# Patient Record
Sex: FEMALE | Race: WHITE | NOT HISPANIC OR LATINO | Employment: UNEMPLOYED | URBAN - NONMETROPOLITAN AREA
[De-identification: names, ages, dates, MRNs, and addresses within clinical notes are randomized per-mention and may not be internally consistent; named-entity substitution may affect disease eponyms.]

---

## 2019-05-14 LAB
EXTERNAL ABO GROUPING: NORMAL
EXTERNAL ANTIBODY SCREEN: NEGATIVE
EXTERNAL CHLAMYDIA SCREEN: NEGATIVE
EXTERNAL GONORRHEA SCREEN: NEGATIVE
EXTERNAL HEPATITIS B SURFACE ANTIGEN: NEGATIVE
EXTERNAL RH FACTOR: POSITIVE
EXTERNAL RUBELLA QUALITATIVE: NORMAL
EXTERNAL SYPHILIS RPR SCREEN: NORMAL
HIV1 P24 AG SERPL QL IA: NEGATIVE

## 2019-11-13 RX ORDER — FERROUS SULFATE 325(65) MG
325 TABLET ORAL 2 TIMES DAILY
COMMUNITY
End: 2020-09-25

## 2019-11-13 RX ORDER — FOLIC ACID 1 MG/1
1 TABLET ORAL DAILY
COMMUNITY
End: 2020-09-25

## 2019-11-13 RX ORDER — PNV,CALCIUM 72/IRON/FOLIC ACID 27 MG-1 MG
1 TABLET ORAL DAILY
COMMUNITY
End: 2020-09-25

## 2019-11-13 RX ORDER — LEVOTHYROXINE SODIUM 0.03 MG/1
25 TABLET ORAL DAILY
Status: ON HOLD | COMMUNITY
End: 2019-12-17 | Stop reason: SDUPTHER

## 2019-11-20 ENCOUNTER — HOSPITAL ENCOUNTER (OUTPATIENT)
Facility: HOSPITAL | Age: 29
Discharge: HOME | End: 2019-11-20
Attending: OBSTETRICS & GYNECOLOGY | Admitting: OBSTETRICS & GYNECOLOGY

## 2019-11-20 VITALS
HEIGHT: 64 IN | TEMPERATURE: 97.7 F | WEIGHT: 206.38 LBS | SYSTOLIC BLOOD PRESSURE: 134 MMHG | HEART RATE: 86 BPM | DIASTOLIC BLOOD PRESSURE: 66 MMHG | BODY MASS INDEX: 35.23 KG/M2 | RESPIRATION RATE: 18 BRPM | OXYGEN SATURATION: 97 %

## 2019-11-20 PROBLEM — Z34.90 PREGNANCY: Status: ACTIVE | Noted: 2019-11-20

## 2019-11-20 LAB — GLUCOSE BLDC GLUCOMTR-MCNC: 108 MG/DL (ref 70–130)

## 2019-11-20 PROCEDURE — G0463 HOSPITAL OUTPT CLINIC VISIT: HCPCS

## 2019-11-20 PROCEDURE — 82962 GLUCOSE BLOOD TEST: CPT

## 2019-11-20 PROCEDURE — 59025 FETAL NON-STRESS TEST: CPT

## 2019-11-20 PROCEDURE — G0378 HOSPITAL OBSERVATION PER HR: HCPCS

## 2019-11-25 ENCOUNTER — OFFICE VISIT (OUTPATIENT)
Dept: FAMILY MEDICINE CLINIC | Facility: CLINIC | Age: 29
End: 2019-11-25

## 2019-11-25 VITALS
OXYGEN SATURATION: 98 % | SYSTOLIC BLOOD PRESSURE: 124 MMHG | BODY MASS INDEX: 35.2 KG/M2 | DIASTOLIC BLOOD PRESSURE: 68 MMHG | WEIGHT: 206.2 LBS | HEIGHT: 64 IN | HEART RATE: 73 BPM

## 2019-11-25 DIAGNOSIS — Z34.90 PREGNANCY, UNSPECIFIED GESTATIONAL AGE: ICD-10-CM

## 2019-11-25 DIAGNOSIS — E03.9 ACQUIRED HYPOTHYROIDISM: ICD-10-CM

## 2019-11-25 DIAGNOSIS — O24.410 DIET CONTROLLED GESTATIONAL DIABETES MELLITUS (GDM), ANTEPARTUM: ICD-10-CM

## 2019-11-25 DIAGNOSIS — J01.00 ACUTE NON-RECURRENT MAXILLARY SINUSITIS: Primary | ICD-10-CM

## 2019-11-25 PROCEDURE — 99203 OFFICE O/P NEW LOW 30 MIN: CPT | Performed by: FAMILY MEDICINE

## 2019-11-25 RX ORDER — AMOXICILLIN 500 MG/1
1000 CAPSULE ORAL 3 TIMES DAILY
Qty: 21 CAPSULE | Refills: 0 | Status: SHIPPED | OUTPATIENT
Start: 2019-11-25 | End: 2019-12-02

## 2019-11-25 RX ORDER — LANCETS 30 GAUGE
EACH MISCELLANEOUS
COMMUNITY
Start: 2019-11-11 | End: 2020-09-25

## 2019-11-25 RX ORDER — ALBUTEROL SULFATE 90 UG/1
AEROSOL, METERED RESPIRATORY (INHALATION)
Refills: 0 | COMMUNITY
Start: 2019-08-22 | End: 2020-10-16

## 2019-12-03 LAB — EXTERNAL GROUP B STREP ANTIGEN: NEGATIVE

## 2019-12-10 ENCOUNTER — HOSPITAL ENCOUNTER (OUTPATIENT)
Facility: HOSPITAL | Age: 29
Discharge: HOME | End: 2019-12-10
Attending: OBSTETRICS & GYNECOLOGY | Admitting: OBSTETRICS & GYNECOLOGY

## 2019-12-10 PROCEDURE — 59025 FETAL NON-STRESS TEST: CPT

## 2019-12-10 PROCEDURE — G0378 HOSPITAL OBSERVATION PER HR: HCPCS

## 2019-12-10 PROCEDURE — G0463 HOSPITAL OUTPT CLINIC VISIT: HCPCS

## 2019-12-10 NOTE — NON STRESS TEST
Daisy Herrera, a  at 36w6d with an GRUPO of 2020, by Patient Reported, was seen at [facility] LABOR DELIVERY for a nonstress test.    Chief Complaint   Patient presents with   • Non-stress Test       Patient Active Problem List   Diagnosis   • Pregnancy       Start Time: 1128  Stop Time: 1158    Interpretation A  Nonstress Test Interpretation A: Reactive (12/10/19 1218 : Elle Caputo, RN)  Comments A: Verified by Celina Howard MD (12/10/19 1218 : Elle Caputo, RN)  Interpretation B  Nonstress Test Interpretation B: Reactive (12/10/19 1218 : Elle Caputo, RN)  Comments B: Verified by Celina Howard MD (12/10/19 1218 : Elle Caputo, RN)

## 2019-12-17 ENCOUNTER — ANESTHESIA (OUTPATIENT)
Dept: LABOR AND DELIVERY | Facility: HOSPITAL | Age: 29
End: 2019-12-17

## 2019-12-17 ENCOUNTER — ANESTHESIA EVENT (OUTPATIENT)
Dept: LABOR AND DELIVERY | Facility: HOSPITAL | Age: 29
End: 2019-12-17

## 2019-12-17 ENCOUNTER — HOSPITAL ENCOUNTER (INPATIENT)
Facility: HOSPITAL | Age: 29
LOS: 3 days | Discharge: HOME | End: 2019-12-20
Attending: OBSTETRICS & GYNECOLOGY | Admitting: OBSTETRICS & GYNECOLOGY

## 2019-12-17 DIAGNOSIS — E03.9 ACQUIRED HYPOTHYROIDISM: Primary | ICD-10-CM

## 2019-12-17 DIAGNOSIS — Z3A.38 38 WEEKS GESTATION OF PREGNANCY: ICD-10-CM

## 2019-12-17 DIAGNOSIS — Z37.9 NORMAL LABOR: Primary | ICD-10-CM

## 2019-12-17 DIAGNOSIS — N97.9 FEMALE STERILITY: ICD-10-CM

## 2019-12-17 LAB
ABO GROUP BLD: NORMAL
BASOPHILS # BLD AUTO: 0.04 10*3/MM3 (ref 0–0.2)
BASOPHILS NFR BLD AUTO: 0.5 % (ref 0–1.5)
BLD GP AB SCN SERPL QL: NEGATIVE
DEPRECATED RDW RBC AUTO: 47.8 FL (ref 37–54)
EOSINOPHIL # BLD AUTO: 0.14 10*3/MM3 (ref 0–0.4)
EOSINOPHIL NFR BLD AUTO: 1.7 % (ref 0.3–6.2)
ERYTHROCYTE [DISTWIDTH] IN BLOOD BY AUTOMATED COUNT: 14.4 % (ref 12.3–15.4)
HCT VFR BLD AUTO: 37.1 % (ref 34–46.6)
HGB BLD-MCNC: 13.1 G/DL (ref 12–15.9)
IMM GRANULOCYTES # BLD AUTO: 0.04 10*3/MM3 (ref 0–0.05)
IMM GRANULOCYTES NFR BLD AUTO: 0.5 % (ref 0–0.5)
LYMPHOCYTES # BLD AUTO: 1.25 10*3/MM3 (ref 0.7–3.1)
LYMPHOCYTES NFR BLD AUTO: 15.4 % (ref 19.6–45.3)
MCH RBC QN AUTO: 32.2 PG (ref 26.6–33)
MCHC RBC AUTO-ENTMCNC: 35.3 G/DL (ref 31.5–35.7)
MCV RBC AUTO: 91.2 FL (ref 79–97)
MONOCYTES # BLD AUTO: 0.44 10*3/MM3 (ref 0.1–0.9)
MONOCYTES NFR BLD AUTO: 5.4 % (ref 5–12)
NEUTROPHILS # BLD AUTO: 6.21 10*3/MM3 (ref 1.7–7)
NEUTROPHILS NFR BLD AUTO: 76.5 % (ref 42.7–76)
NRBC BLD AUTO-RTO: 0 /100 WBC (ref 0–0.2)
PLATELET # BLD AUTO: 174 10*3/MM3 (ref 140–450)
PMV BLD AUTO: 11.5 FL (ref 6–12)
RBC # BLD AUTO: 4.07 10*6/MM3 (ref 3.77–5.28)
RH BLD: POSITIVE
T&S EXPIRATION DATE: NORMAL
WBC NRBC COR # BLD: 8.12 10*3/MM3 (ref 3.4–10.8)

## 2019-12-17 PROCEDURE — C1755 CATHETER, INTRASPINAL: HCPCS | Performed by: NURSE ANESTHETIST, CERTIFIED REGISTERED

## 2019-12-17 PROCEDURE — 25010000002 FENTANYL CITRATE (PF) 250 MCG/5ML SOLUTION: Performed by: NURSE ANESTHETIST, CERTIFIED REGISTERED

## 2019-12-17 PROCEDURE — 86850 RBC ANTIBODY SCREEN: CPT | Performed by: OBSTETRICS & GYNECOLOGY

## 2019-12-17 PROCEDURE — 86900 BLOOD TYPING SEROLOGIC ABO: CPT | Performed by: OBSTETRICS & GYNECOLOGY

## 2019-12-17 PROCEDURE — 86901 BLOOD TYPING SEROLOGIC RH(D): CPT | Performed by: OBSTETRICS & GYNECOLOGY

## 2019-12-17 PROCEDURE — 85027 COMPLETE CBC AUTOMATED: CPT | Performed by: OBSTETRICS & GYNECOLOGY

## 2019-12-17 PROCEDURE — 25010000002 ROPIVACAINE PER 1 MG: Performed by: NURSE ANESTHETIST, CERTIFIED REGISTERED

## 2019-12-17 RX ORDER — SODIUM CHLORIDE 0.9 % (FLUSH) 0.9 %
3 SYRINGE (ML) INJECTION EVERY 12 HOURS SCHEDULED
Status: DISCONTINUED | OUTPATIENT
Start: 2019-12-17 | End: 2019-12-18 | Stop reason: HOSPADM

## 2019-12-17 RX ORDER — HETASTARCH 6 G/100ML
500 INJECTION, SOLUTION INTRAVENOUS CONTINUOUS
Status: DISCONTINUED | OUTPATIENT
Start: 2019-12-17 | End: 2019-12-18

## 2019-12-17 RX ORDER — OXYTOCIN/0.9 % SODIUM CHLORIDE 30/500 ML
2-30 PLASTIC BAG, INJECTION (ML) INTRAVENOUS
Status: DISCONTINUED | OUTPATIENT
Start: 2019-12-18 | End: 2019-12-18 | Stop reason: HOSPADM

## 2019-12-17 RX ORDER — EPHEDRINE SULFATE 50 MG/ML
10 INJECTION, SOLUTION INTRAVENOUS
Status: DISCONTINUED | OUTPATIENT
Start: 2019-12-17 | End: 2019-12-18 | Stop reason: HOSPADM

## 2019-12-17 RX ORDER — ONDANSETRON 2 MG/ML
4 INJECTION INTRAMUSCULAR; INTRAVENOUS EVERY 6 HOURS PRN
Status: DISCONTINUED | OUTPATIENT
Start: 2019-12-17 | End: 2019-12-18 | Stop reason: HOSPADM

## 2019-12-17 RX ORDER — BUTORPHANOL TARTRATE 1 MG/ML
1 INJECTION, SOLUTION INTRAMUSCULAR; INTRAVENOUS
Status: DISCONTINUED | OUTPATIENT
Start: 2019-12-17 | End: 2019-12-18 | Stop reason: HOSPADM

## 2019-12-17 RX ORDER — LEVOTHYROXINE SODIUM 0.03 MG/1
25 TABLET ORAL DAILY
Qty: 30 TABLET | Refills: 2 | Status: SHIPPED | OUTPATIENT
Start: 2019-12-17 | End: 2020-04-13

## 2019-12-17 RX ORDER — ACETAMINOPHEN 500 MG
1000 TABLET ORAL ONCE
Status: COMPLETED | OUTPATIENT
Start: 2019-12-18 | End: 2019-12-17

## 2019-12-17 RX ORDER — LIDOCAINE HYDROCHLORIDE AND EPINEPHRINE 15; 5 MG/ML; UG/ML
INJECTION, SOLUTION EPIDURAL AS NEEDED
Status: DISCONTINUED | OUTPATIENT
Start: 2019-12-17 | End: 2019-12-18 | Stop reason: SURG

## 2019-12-17 RX ORDER — BUTORPHANOL TARTRATE 1 MG/ML
2 INJECTION, SOLUTION INTRAMUSCULAR; INTRAVENOUS
Status: DISCONTINUED | OUTPATIENT
Start: 2019-12-17 | End: 2019-12-18 | Stop reason: HOSPADM

## 2019-12-17 RX ORDER — ROPIVACAINE HYDROCHLORIDE 5 MG/ML
INJECTION, SOLUTION EPIDURAL; INFILTRATION; PERINEURAL AS NEEDED
Status: DISCONTINUED | OUTPATIENT
Start: 2019-12-17 | End: 2019-12-18 | Stop reason: SURG

## 2019-12-17 RX ORDER — HETASTARCH 6 G/100ML
INJECTION, SOLUTION INTRAVENOUS
Status: COMPLETED
Start: 2019-12-17 | End: 2019-12-17

## 2019-12-17 RX ORDER — FENTANYL CITRATE 50 UG/ML
INJECTION, SOLUTION INTRAMUSCULAR; INTRAVENOUS AS NEEDED
Status: DISCONTINUED | OUTPATIENT
Start: 2019-12-17 | End: 2019-12-18 | Stop reason: SURG

## 2019-12-17 RX ORDER — SODIUM CHLORIDE, SODIUM LACTATE, POTASSIUM CHLORIDE, CALCIUM CHLORIDE 600; 310; 30; 20 MG/100ML; MG/100ML; MG/100ML; MG/100ML
125 INJECTION, SOLUTION INTRAVENOUS CONTINUOUS
Status: DISCONTINUED | OUTPATIENT
Start: 2019-12-17 | End: 2019-12-18

## 2019-12-17 RX ORDER — MISOPROSTOL 100 MCG
50 TABLET ORAL
Status: DISCONTINUED | OUTPATIENT
Start: 2019-12-17 | End: 2019-12-17

## 2019-12-17 RX ORDER — ONDANSETRON 4 MG/1
4 TABLET, FILM COATED ORAL EVERY 6 HOURS PRN
Status: DISCONTINUED | OUTPATIENT
Start: 2019-12-17 | End: 2019-12-18 | Stop reason: HOSPADM

## 2019-12-17 RX ORDER — LIDOCAINE HYDROCHLORIDE 10 MG/ML
5 INJECTION, SOLUTION EPIDURAL; INFILTRATION; INTRACAUDAL; PERINEURAL AS NEEDED
Status: DISCONTINUED | OUTPATIENT
Start: 2019-12-17 | End: 2019-12-18 | Stop reason: HOSPADM

## 2019-12-17 RX ORDER — ROPIVACAINE HYDROCHLORIDE 2 MG/ML
INJECTION, SOLUTION EPIDURAL; INFILTRATION; PERINEURAL AS NEEDED
Status: DISCONTINUED | OUTPATIENT
Start: 2019-12-17 | End: 2019-12-18 | Stop reason: SURG

## 2019-12-17 RX ORDER — SODIUM CHLORIDE 0.9 % (FLUSH) 0.9 %
10 SYRINGE (ML) INJECTION AS NEEDED
Status: DISCONTINUED | OUTPATIENT
Start: 2019-12-17 | End: 2019-12-18 | Stop reason: HOSPADM

## 2019-12-17 RX ORDER — MISOPROSTOL 100 MCG
25 TABLET ORAL
Status: DISCONTINUED | OUTPATIENT
Start: 2019-12-17 | End: 2019-12-17

## 2019-12-17 RX ADMIN — ROPIVACAINE HYDROCHLORIDE 5 ML: 5 INJECTION, SOLUTION EPIDURAL; INFILTRATION; PERINEURAL at 17:58

## 2019-12-17 RX ADMIN — FENTANYL CITRATE 50 MCG: 50 INJECTION, SOLUTION INTRAMUSCULAR; INTRAVENOUS at 17:58

## 2019-12-17 RX ADMIN — SODIUM CHLORIDE, POTASSIUM CHLORIDE, SODIUM LACTATE AND CALCIUM CHLORIDE 125 ML/HR: 600; 310; 30; 20 INJECTION, SOLUTION INTRAVENOUS at 10:35

## 2019-12-17 RX ADMIN — SODIUM CHLORIDE, POTASSIUM CHLORIDE, SODIUM LACTATE AND CALCIUM CHLORIDE 125 ML/HR: 600; 310; 30; 20 INJECTION, SOLUTION INTRAVENOUS at 22:18

## 2019-12-17 RX ADMIN — FENTANYL CITRATE 200 MCG: 50 INJECTION, SOLUTION INTRAMUSCULAR; INTRAVENOUS at 18:01

## 2019-12-17 RX ADMIN — ACETAMINOPHEN 1000 MG: 500 TABLET, FILM COATED ORAL at 23:20

## 2019-12-17 RX ADMIN — MISOPROSTOL 50 MCG: 100 TABLET ORAL at 15:09

## 2019-12-17 RX ADMIN — SODIUM CHLORIDE, POTASSIUM CHLORIDE, SODIUM LACTATE AND CALCIUM CHLORIDE 125 ML/HR: 600; 310; 30; 20 INJECTION, SOLUTION INTRAVENOUS at 17:50

## 2019-12-17 RX ADMIN — SODIUM CHLORIDE, POTASSIUM CHLORIDE, SODIUM LACTATE AND CALCIUM CHLORIDE 1000 ML: 600; 310; 30; 20 INJECTION, SOLUTION INTRAVENOUS at 17:08

## 2019-12-17 RX ADMIN — MISOPROSTOL 50 MCG: 100 TABLET ORAL at 20:26

## 2019-12-17 RX ADMIN — HETASTARCH 500 ML: 6 INJECTION, SOLUTION INTRAVENOUS at 21:10

## 2019-12-17 RX ADMIN — HETASTARCH IN SODIUM CHLORIDE 500 ML: 6; .9 INJECTION, SOLUTION INTRAVENOUS at 21:10

## 2019-12-17 RX ADMIN — ROPIVACAINE HYDROCHLORIDE 5 ML: 2 INJECTION, SOLUTION EPIDURAL; INFILTRATION at 17:58

## 2019-12-17 RX ADMIN — LIDOCAINE HYDROCHLORIDE AND EPINEPHRINE 3 ML: 15; 5 INJECTION, SOLUTION EPIDURAL at 17:52

## 2019-12-17 RX ADMIN — MISOPROSTOL 25 MCG: 100 TABLET ORAL at 11:07

## 2019-12-17 NOTE — ANESTHESIA PREPROCEDURE EVALUATION
Anesthesia Evaluation     Patient summary reviewed   NPO Solid Status: > 4 hours  NPO Liquid Status: < 2 hours           Airway   Mallampati: I  TM distance: >3 FB  Neck ROM: full  No difficulty expected  Dental    (+) upper dentures    Pulmonary - negative pulmonary ROS and normal exam   Cardiovascular - negative cardio ROS and normal exam  Exercise tolerance: excellent (>7 METS)        Neuro/Psych  (+) seizures well controlled,     GI/Hepatic/Renal/Endo    (+) obesity,   diabetes mellitus well controlled, thyroid problem hypothyroidism    Musculoskeletal (-) negative ROS    Abdominal  - normal exam   Substance History - negative use     OB/GYN    (+) Pregnant,         Other - negative ROS           Phys Exam Other: No results found for: WBC, HGB, HCT, MCV, PLT                Anesthesia Plan    ASA 2     epidural       Anesthetic plan, all risks, benefits, and alternatives have been provided, discussed and informed consent has been obtained with: patient.

## 2019-12-18 PROBLEM — Z37.9 NORMAL LABOR: Status: RESOLVED | Noted: 2019-12-17 | Resolved: 2019-12-18

## 2019-12-18 PROBLEM — Z34.90 PREGNANCY: Status: RESOLVED | Noted: 2019-11-20 | Resolved: 2019-12-18

## 2019-12-18 PROCEDURE — 25010000002 ONDANSETRON PER 1 MG: Performed by: NURSE ANESTHETIST, CERTIFIED REGISTERED

## 2019-12-18 PROCEDURE — 88302 TISSUE EXAM BY PATHOLOGIST: CPT | Performed by: OBSTETRICS & GYNECOLOGY

## 2019-12-18 PROCEDURE — 25010000002 ONDANSETRON PER 1 MG: Performed by: OBSTETRICS & GYNECOLOGY

## 2019-12-18 PROCEDURE — 25010000002 TERBUTALINE PER 1 MG: Performed by: OBSTETRICS & GYNECOLOGY

## 2019-12-18 PROCEDURE — 25010000002 FENTANYL CITRATE (PF) 100 MCG/2ML SOLUTION: Performed by: NURSE ANESTHETIST, CERTIFIED REGISTERED

## 2019-12-18 PROCEDURE — 88307 TISSUE EXAM BY PATHOLOGIST: CPT | Performed by: OBSTETRICS & GYNECOLOGY

## 2019-12-18 PROCEDURE — 25010000002 HYDROMORPHONE 1 MG/ML SOLUTION: Performed by: NURSE ANESTHETIST, CERTIFIED REGISTERED

## 2019-12-18 PROCEDURE — C1765 ADHESION BARRIER: HCPCS | Performed by: OBSTETRICS & GYNECOLOGY

## 2019-12-18 PROCEDURE — 25010000002 METHYLERGONOVINE MALEATE PER 0.2 MG: Performed by: NURSE ANESTHETIST, CERTIFIED REGISTERED

## 2019-12-18 PROCEDURE — 51702 INSERT TEMP BLADDER CATH: CPT

## 2019-12-18 PROCEDURE — 94799 UNLISTED PULMONARY SVC/PX: CPT

## 2019-12-18 PROCEDURE — 3E033VJ INTRODUCTION OF OTHER HORMONE INTO PERIPHERAL VEIN, PERCUTANEOUS APPROACH: ICD-10-PCS | Performed by: OBSTETRICS & GYNECOLOGY

## 2019-12-18 PROCEDURE — 25010000002 CEFEPIME PER 500 MG: Performed by: OBSTETRICS & GYNECOLOGY

## 2019-12-18 PROCEDURE — 25010000002 DEXAMETHASONE PER 1 MG: Performed by: NURSE ANESTHETIST, CERTIFIED REGISTERED

## 2019-12-18 PROCEDURE — 25010000002 ROPIVACAINE PER 1 MG: Performed by: NURSE ANESTHETIST, CERTIFIED REGISTERED

## 2019-12-18 RX ORDER — DOCUSATE SODIUM 100 MG/1
100 CAPSULE, LIQUID FILLED ORAL 2 TIMES DAILY PRN
Status: DISCONTINUED | OUTPATIENT
Start: 2019-12-18 | End: 2019-12-20 | Stop reason: HOSPADM

## 2019-12-18 RX ORDER — OXYCODONE AND ACETAMINOPHEN 7.5; 325 MG/1; MG/1
2 TABLET ORAL EVERY 4 HOURS PRN
Status: ACTIVE | OUTPATIENT
Start: 2019-12-18 | End: 2019-12-19

## 2019-12-18 RX ORDER — METHYLERGONOVINE MALEATE 0.2 MG/ML
200 INJECTION INTRAVENOUS
Status: DISPENSED | OUTPATIENT
Start: 2019-12-18 | End: 2019-12-19

## 2019-12-18 RX ORDER — DEXAMETHASONE SODIUM PHOSPHATE 4 MG/ML
INJECTION, SOLUTION INTRA-ARTICULAR; INTRALESIONAL; INTRAMUSCULAR; INTRAVENOUS; SOFT TISSUE AS NEEDED
Status: DISCONTINUED | OUTPATIENT
Start: 2019-12-18 | End: 2019-12-18 | Stop reason: SURG

## 2019-12-18 RX ORDER — PROMETHAZINE HYDROCHLORIDE 12.5 MG/1
12.5 SUPPOSITORY RECTAL EVERY 6 HOURS PRN
Status: DISCONTINUED | OUTPATIENT
Start: 2019-12-18 | End: 2019-12-20 | Stop reason: HOSPADM

## 2019-12-18 RX ORDER — OXYTOCIN/0.9 % SODIUM CHLORIDE 30/500 ML
250 PLASTIC BAG, INJECTION (ML) INTRAVENOUS CONTINUOUS
Status: ACTIVE | OUTPATIENT
Start: 2019-12-18 | End: 2019-12-18

## 2019-12-18 RX ORDER — BUPIVACAINE HCL/0.9 % NACL/PF 0.1 %
2 PLASTIC BAG, INJECTION (ML) EPIDURAL ONCE
Status: COMPLETED | OUTPATIENT
Start: 2019-12-18 | End: 2019-12-18

## 2019-12-18 RX ORDER — NALBUPHINE HCL 10 MG/ML
2.5 AMPUL (ML) INJECTION EVERY 4 HOURS PRN
Status: ACTIVE | OUTPATIENT
Start: 2019-12-18 | End: 2019-12-19

## 2019-12-18 RX ORDER — BISACODYL 5 MG/1
10 TABLET, DELAYED RELEASE ORAL DAILY PRN
Status: DISCONTINUED | OUTPATIENT
Start: 2019-12-18 | End: 2019-12-20 | Stop reason: HOSPADM

## 2019-12-18 RX ORDER — PROMETHAZINE HYDROCHLORIDE 25 MG/ML
12.5 INJECTION, SOLUTION INTRAMUSCULAR; INTRAVENOUS EVERY 6 HOURS PRN
Status: DISCONTINUED | OUTPATIENT
Start: 2019-12-18 | End: 2019-12-18 | Stop reason: HOSPADM

## 2019-12-18 RX ORDER — KETOROLAC TROMETHAMINE 30 MG/ML
30 INJECTION, SOLUTION INTRAMUSCULAR; INTRAVENOUS EVERY 6 HOURS
Status: DISCONTINUED | OUTPATIENT
Start: 2019-12-18 | End: 2019-12-19

## 2019-12-18 RX ORDER — IBUPROFEN 800 MG/1
800 TABLET ORAL EVERY 8 HOURS PRN
Status: DISCONTINUED | OUTPATIENT
Start: 2019-12-19 | End: 2019-12-19 | Stop reason: SDUPTHER

## 2019-12-18 RX ORDER — CALCIUM CARBONATE 200(500)MG
1 TABLET,CHEWABLE ORAL EVERY 6 HOURS PRN
Status: DISCONTINUED | OUTPATIENT
Start: 2019-12-18 | End: 2019-12-20 | Stop reason: HOSPADM

## 2019-12-18 RX ORDER — NALOXONE HCL 0.4 MG/ML
0.4 VIAL (ML) INJECTION
Status: ACTIVE | OUTPATIENT
Start: 2019-12-18 | End: 2019-12-19

## 2019-12-18 RX ORDER — ONDANSETRON 2 MG/ML
4 INJECTION INTRAMUSCULAR; INTRAVENOUS EVERY 6 HOURS PRN
Status: DISCONTINUED | OUTPATIENT
Start: 2019-12-18 | End: 2019-12-20 | Stop reason: HOSPADM

## 2019-12-18 RX ORDER — CARBOPROST TROMETHAMINE 250 UG/ML
250 INJECTION, SOLUTION INTRAMUSCULAR
Status: ACTIVE | OUTPATIENT
Start: 2019-12-18 | End: 2019-12-19

## 2019-12-18 RX ORDER — OXYCODONE AND ACETAMINOPHEN 10; 325 MG/1; MG/1
1 TABLET ORAL EVERY 6 HOURS PRN
Status: DISCONTINUED | OUTPATIENT
Start: 2019-12-19 | End: 2019-12-20 | Stop reason: HOSPADM

## 2019-12-18 RX ORDER — ONDANSETRON 4 MG/1
4 TABLET, FILM COATED ORAL EVERY 6 HOURS PRN
Status: DISCONTINUED | OUTPATIENT
Start: 2019-12-18 | End: 2019-12-18 | Stop reason: HOSPADM

## 2019-12-18 RX ORDER — BISACODYL 10 MG
10 SUPPOSITORY, RECTAL RECTAL DAILY PRN
Status: DISCONTINUED | OUTPATIENT
Start: 2019-12-18 | End: 2019-12-20 | Stop reason: HOSPADM

## 2019-12-18 RX ORDER — METHYLERGONOVINE MALEATE 0.2 MG/ML
200 INJECTION INTRAVENOUS ONCE AS NEEDED
Status: DISCONTINUED | OUTPATIENT
Start: 2019-12-18 | End: 2019-12-18 | Stop reason: HOSPADM

## 2019-12-18 RX ORDER — OXYCODONE HYDROCHLORIDE AND ACETAMINOPHEN 5; 325 MG/1; MG/1
1 TABLET ORAL EVERY 4 HOURS PRN
Status: DISCONTINUED | OUTPATIENT
Start: 2019-12-19 | End: 2019-12-20 | Stop reason: HOSPADM

## 2019-12-18 RX ORDER — OXYTOCIN/0.9 % SODIUM CHLORIDE 30/500 ML
999 PLASTIC BAG, INJECTION (ML) INTRAVENOUS ONCE
Status: DISCONTINUED | OUTPATIENT
Start: 2019-12-18 | End: 2019-12-20 | Stop reason: HOSPADM

## 2019-12-18 RX ORDER — LIDOCAINE HYDROCHLORIDE AND EPINEPHRINE BITARTRATE 20; .01 MG/ML; MG/ML
INJECTION, SOLUTION SUBCUTANEOUS AS NEEDED
Status: DISCONTINUED | OUTPATIENT
Start: 2019-12-18 | End: 2019-12-18 | Stop reason: SURG

## 2019-12-18 RX ORDER — SODIUM CHLORIDE, SODIUM LACTATE, POTASSIUM CHLORIDE, CALCIUM CHLORIDE 600; 310; 30; 20 MG/100ML; MG/100ML; MG/100ML; MG/100ML
125 INJECTION, SOLUTION INTRAVENOUS CONTINUOUS
Status: DISCONTINUED | OUTPATIENT
Start: 2019-12-18 | End: 2019-12-20 | Stop reason: HOSPADM

## 2019-12-18 RX ORDER — METHYLERGONOVINE MALEATE 0.2 MG/ML
INJECTION INTRAVENOUS AS NEEDED
Status: DISCONTINUED | OUTPATIENT
Start: 2019-12-18 | End: 2019-12-18 | Stop reason: SURG

## 2019-12-18 RX ORDER — PRENATAL VIT/IRON FUM/FOLIC AC 27MG-0.8MG
1 TABLET ORAL DAILY
Status: DISCONTINUED | OUTPATIENT
Start: 2019-12-18 | End: 2019-12-20 | Stop reason: HOSPADM

## 2019-12-18 RX ORDER — OXYTOCIN/0.9 % SODIUM CHLORIDE 30/500 ML
125 PLASTIC BAG, INJECTION (ML) INTRAVENOUS CONTINUOUS PRN
Status: DISCONTINUED | OUTPATIENT
Start: 2019-12-18 | End: 2019-12-18 | Stop reason: HOSPADM

## 2019-12-18 RX ORDER — SODIUM CHLORIDE 0.9 % (FLUSH) 0.9 %
3-10 SYRINGE (ML) INJECTION AS NEEDED
Status: DISCONTINUED | OUTPATIENT
Start: 2019-12-18 | End: 2019-12-20 | Stop reason: HOSPADM

## 2019-12-18 RX ORDER — AMOXICILLIN 250 MG
1 CAPSULE ORAL 2 TIMES DAILY PRN
Status: DISCONTINUED | OUTPATIENT
Start: 2019-12-18 | End: 2019-12-20 | Stop reason: HOSPADM

## 2019-12-18 RX ORDER — ONDANSETRON 4 MG/1
4 TABLET, FILM COATED ORAL EVERY 6 HOURS PRN
Status: DISCONTINUED | OUTPATIENT
Start: 2019-12-18 | End: 2019-12-20 | Stop reason: HOSPADM

## 2019-12-18 RX ORDER — MAGNESIUM HYDROXIDE 1200 MG/15ML
LIQUID ORAL AS NEEDED
Status: DISCONTINUED | OUTPATIENT
Start: 2019-12-18 | End: 2019-12-20 | Stop reason: HOSPADM

## 2019-12-18 RX ORDER — ONDANSETRON 2 MG/ML
INJECTION INTRAMUSCULAR; INTRAVENOUS AS NEEDED
Status: DISCONTINUED | OUTPATIENT
Start: 2019-12-18 | End: 2019-12-18 | Stop reason: SURG

## 2019-12-18 RX ORDER — SIMETHICONE 80 MG
80 TABLET,CHEWABLE ORAL 4 TIMES DAILY PRN
Status: DISCONTINUED | OUTPATIENT
Start: 2019-12-18 | End: 2019-12-20 | Stop reason: HOSPADM

## 2019-12-18 RX ORDER — MISOPROSTOL 200 UG/1
800 TABLET ORAL ONCE AS NEEDED
Status: ACTIVE | OUTPATIENT
Start: 2019-12-18 | End: 2019-12-19

## 2019-12-18 RX ORDER — BUTORPHANOL TARTRATE 1 MG/ML
0.5 INJECTION, SOLUTION INTRAMUSCULAR; INTRAVENOUS EVERY 6 HOURS PRN
Status: ACTIVE | OUTPATIENT
Start: 2019-12-18 | End: 2019-12-19

## 2019-12-18 RX ORDER — ONDANSETRON 2 MG/ML
4 INJECTION INTRAMUSCULAR; INTRAVENOUS EVERY 6 HOURS PRN
Status: DISCONTINUED | OUTPATIENT
Start: 2019-12-18 | End: 2019-12-18 | Stop reason: HOSPADM

## 2019-12-18 RX ORDER — OXYCODONE AND ACETAMINOPHEN 7.5; 325 MG/1; MG/1
1 TABLET ORAL EVERY 4 HOURS PRN
Status: DISPENSED | OUTPATIENT
Start: 2019-12-18 | End: 2019-12-19

## 2019-12-18 RX ORDER — TRISODIUM CITRATE DIHYDRATE AND CITRIC ACID MONOHYDRATE 500; 334 MG/5ML; MG/5ML
15 SOLUTION ORAL ONCE
Status: COMPLETED | OUTPATIENT
Start: 2019-12-18 | End: 2019-12-18

## 2019-12-18 RX ORDER — SODIUM CHLORIDE 0.9 % (FLUSH) 0.9 %
3 SYRINGE (ML) INJECTION EVERY 12 HOURS SCHEDULED
Status: DISCONTINUED | OUTPATIENT
Start: 2019-12-18 | End: 2019-12-20 | Stop reason: HOSPADM

## 2019-12-18 RX ORDER — PROMETHAZINE HYDROCHLORIDE 12.5 MG/1
12.5 SUPPOSITORY RECTAL EVERY 6 HOURS PRN
Status: DISCONTINUED | OUTPATIENT
Start: 2019-12-18 | End: 2019-12-18 | Stop reason: HOSPADM

## 2019-12-18 RX ORDER — FAMOTIDINE 10 MG/ML
20 INJECTION, SOLUTION INTRAVENOUS ONCE AS NEEDED
Status: COMPLETED | OUTPATIENT
Start: 2019-12-18 | End: 2019-12-18

## 2019-12-18 RX ORDER — MISOPROSTOL 200 UG/1
800 TABLET ORAL AS NEEDED
Status: DISCONTINUED | OUTPATIENT
Start: 2019-12-18 | End: 2019-12-18 | Stop reason: HOSPADM

## 2019-12-18 RX ORDER — CARBOPROST TROMETHAMINE 250 UG/ML
250 INJECTION, SOLUTION INTRAMUSCULAR AS NEEDED
Status: DISCONTINUED | OUTPATIENT
Start: 2019-12-18 | End: 2019-12-18 | Stop reason: HOSPADM

## 2019-12-18 RX ORDER — OXYTOCIN 10 [USP'U]/ML
INJECTION, SOLUTION INTRAMUSCULAR; INTRAVENOUS AS NEEDED
Status: DISCONTINUED | OUTPATIENT
Start: 2019-12-18 | End: 2019-12-18 | Stop reason: SURG

## 2019-12-18 RX ORDER — PROMETHAZINE HYDROCHLORIDE 25 MG/1
12.5 TABLET ORAL EVERY 6 HOURS PRN
Status: DISCONTINUED | OUTPATIENT
Start: 2019-12-18 | End: 2019-12-18 | Stop reason: HOSPADM

## 2019-12-18 RX ORDER — PROMETHAZINE HYDROCHLORIDE 25 MG/1
25 TABLET ORAL EVERY 6 HOURS PRN
Status: DISCONTINUED | OUTPATIENT
Start: 2019-12-18 | End: 2019-12-20 | Stop reason: HOSPADM

## 2019-12-18 RX ORDER — TERBUTALINE SULFATE 1 MG/ML
0.25 INJECTION, SOLUTION SUBCUTANEOUS ONCE
Status: COMPLETED | OUTPATIENT
Start: 2019-12-18 | End: 2019-12-18

## 2019-12-18 RX ORDER — METHYLERGONOVINE MALEATE 0.2 MG/ML
200 INJECTION INTRAVENOUS ONCE AS NEEDED
Status: DISCONTINUED | OUTPATIENT
Start: 2019-12-18 | End: 2019-12-18

## 2019-12-18 RX ORDER — OXYTOCIN/0.9 % SODIUM CHLORIDE 30/500 ML
125 PLASTIC BAG, INJECTION (ML) INTRAVENOUS CONTINUOUS PRN
Status: DISCONTINUED | OUTPATIENT
Start: 2019-12-18 | End: 2019-12-20 | Stop reason: HOSPADM

## 2019-12-18 RX ORDER — PROMETHAZINE HYDROCHLORIDE 25 MG/ML
12.5 INJECTION, SOLUTION INTRAMUSCULAR; INTRAVENOUS EVERY 6 HOURS PRN
Status: DISCONTINUED | OUTPATIENT
Start: 2019-12-18 | End: 2019-12-20 | Stop reason: HOSPADM

## 2019-12-18 RX ORDER — FENTANYL CITRATE 50 UG/ML
INJECTION, SOLUTION INTRAMUSCULAR; INTRAVENOUS AS NEEDED
Status: DISCONTINUED | OUTPATIENT
Start: 2019-12-18 | End: 2019-12-18 | Stop reason: SURG

## 2019-12-18 RX ORDER — OXYTOCIN/0.9 % SODIUM CHLORIDE 30/500 ML
999 PLASTIC BAG, INJECTION (ML) INTRAVENOUS ONCE
Status: DISCONTINUED | OUTPATIENT
Start: 2019-12-18 | End: 2019-12-18 | Stop reason: HOSPADM

## 2019-12-18 RX ORDER — IBUPROFEN 800 MG/1
800 TABLET ORAL EVERY 8 HOURS PRN
Status: DISCONTINUED | OUTPATIENT
Start: 2019-12-18 | End: 2019-12-18 | Stop reason: HOSPADM

## 2019-12-18 RX ADMIN — Medication 2 G: at 09:16

## 2019-12-18 RX ADMIN — CEFEPIME HYDROCHLORIDE 2 G: 2 INJECTION, POWDER, FOR SOLUTION INTRAVENOUS at 14:24

## 2019-12-18 RX ADMIN — SODIUM CHLORIDE, POTASSIUM CHLORIDE, SODIUM LACTATE AND CALCIUM CHLORIDE: 600; 310; 30; 20 INJECTION, SOLUTION INTRAVENOUS at 09:44

## 2019-12-18 RX ADMIN — DEXAMETHASONE SODIUM PHOSPHATE 4 MG: 4 INJECTION, SOLUTION INTRAMUSCULAR; INTRAVENOUS at 09:30

## 2019-12-18 RX ADMIN — LIDOCAINE HYDROCHLORIDE,EPINEPHRINE BITARTRATE 5 ML: 20; .01 INJECTION, SOLUTION INFILTRATION; PERINEURAL at 09:18

## 2019-12-18 RX ADMIN — ONDANSETRON HYDROCHLORIDE 4 MG: 2 SOLUTION INTRAMUSCULAR; INTRAVENOUS at 09:40

## 2019-12-18 RX ADMIN — METRONIDAZOLE 500 MG: 500 SOLUTION INTRAVENOUS at 10:56

## 2019-12-18 RX ADMIN — FENTANYL CITRATE 100 MCG: 50 INJECTION, SOLUTION INTRAMUSCULAR; INTRAVENOUS at 09:36

## 2019-12-18 RX ADMIN — OXYTOCIN 20 UNITS: 10 INJECTION, SOLUTION INTRAMUSCULAR; INTRAVENOUS at 09:44

## 2019-12-18 RX ADMIN — HYDROMORPHONE HYDROCHLORIDE 1000 MCG: 1 INJECTION, SOLUTION INTRAMUSCULAR; INTRAVENOUS; SUBCUTANEOUS at 10:23

## 2019-12-18 RX ADMIN — METRONIDAZOLE 500 MG: 500 SOLUTION INTRAVENOUS at 18:14

## 2019-12-18 RX ADMIN — METHYLERGONOVINE MALEATE 200 MCG: 0.2 INJECTION, SOLUTION INTRAMUSCULAR; INTRAVENOUS at 09:42

## 2019-12-18 RX ADMIN — ONDANSETRON HYDROCHLORIDE 4 MG: 2 SOLUTION INTRAMUSCULAR; INTRAVENOUS at 09:30

## 2019-12-18 RX ADMIN — ONDANSETRON HYDROCHLORIDE 4 MG: 2 SOLUTION INTRAMUSCULAR; INTRAVENOUS at 19:08

## 2019-12-18 RX ADMIN — TERBUTALINE SULFATE 0.25 MG: 1 INJECTION SUBCUTANEOUS at 07:58

## 2019-12-18 RX ADMIN — SODIUM CHLORIDE, POTASSIUM CHLORIDE, SODIUM LACTATE AND CALCIUM CHLORIDE 125 ML/HR: 600; 310; 30; 20 INJECTION, SOLUTION INTRAVENOUS at 14:21

## 2019-12-18 RX ADMIN — FAMOTIDINE 20 MG: 10 INJECTION, SOLUTION INTRAVENOUS at 09:00

## 2019-12-18 RX ADMIN — METRONIDAZOLE 500 MG: 500 SOLUTION INTRAVENOUS at 23:13

## 2019-12-18 RX ADMIN — SODIUM CHLORIDE, POTASSIUM CHLORIDE, SODIUM LACTATE AND CALCIUM CHLORIDE 999 ML/HR: 600; 310; 30; 20 INJECTION, SOLUTION INTRAVENOUS at 09:00

## 2019-12-18 RX ADMIN — OXYCODONE HYDROCHLORIDE AND ACETAMINOPHEN 1 TABLET: 7.5; 325 TABLET ORAL at 13:26

## 2019-12-18 RX ADMIN — SODIUM CHLORIDE, POTASSIUM CHLORIDE, SODIUM LACTATE AND CALCIUM CHLORIDE: 600; 310; 30; 20 INJECTION, SOLUTION INTRAVENOUS at 10:26

## 2019-12-18 RX ADMIN — OXYTOCIN-SODIUM CHLORIDE 0.9% IV SOLN 30 UNIT/500ML 2 MILLI-UNITS/MIN: 30-0.9/5 SOLUTION at 00:08

## 2019-12-18 RX ADMIN — OXYTOCIN 20 UNITS: 10 INJECTION, SOLUTION INTRAMUSCULAR; INTRAVENOUS at 09:32

## 2019-12-18 RX ADMIN — SODIUM CITRATE AND CITRIC ACID MONOHYDRATE 15 ML: 500; 334 SOLUTION ORAL at 09:00

## 2019-12-18 RX ADMIN — SODIUM CHLORIDE, POTASSIUM CHLORIDE, SODIUM LACTATE AND CALCIUM CHLORIDE 125 ML/HR: 600; 310; 30; 20 INJECTION, SOLUTION INTRAVENOUS at 06:12

## 2019-12-18 RX ADMIN — OXYTOCIN 10 UNITS: 10 INJECTION, SOLUTION INTRAMUSCULAR; INTRAVENOUS at 09:35

## 2019-12-18 RX ADMIN — IBUPROFEN 800 MG: 800 TABLET, FILM COATED ORAL at 13:26

## 2019-12-18 RX ADMIN — PRENATAL VIT W/ FE FUMARATE-FA TAB 27-0.8 MG 1 TABLET: 27-0.8 TAB at 16:45

## 2019-12-18 RX ADMIN — ROPIVACAINE HYDROCHLORIDE 10 ML: 5 INJECTION, SOLUTION EPIDURAL; INFILTRATION; PERINEURAL at 09:14

## 2019-12-18 RX ADMIN — OXYTOCIN 10 UNITS: 10 INJECTION, SOLUTION INTRAMUSCULAR; INTRAVENOUS at 09:40

## 2019-12-18 NOTE — LACTATION NOTE
"This note was copied from a baby's chart.  Name: Andria  Day:0  Dx: Low birth weigh in full term infant 1750-1999g, meconium aspiration with respiratory symptoms,  depression  Birth Gestation:38w0d  Adjusted Gestation:  Birth weight: 4-3.4 (1910g)  Last weight:              % of weight loss:    Feeding Orders:NPO  Maternal Hx:, twin pregnancy, anemia, Epilepsy, DM, Thyroid disease,  second child \"for a couple of months\"  Prenatal Medications: Iron, Folic Acid, Synthroid, PNV  Pump available:YES.  Pumping history in the last 24 hours: Initiated hand expression at 1220, initiated pumping at 1610, mother direct breastfeeding infant A      Infant B Andria, Admitted to nicu immediately after delivery due to suppressed respiratory effort with meconium aspiration. Infant also with IUGR. Feedings to be withheld at this time. With permission from mother, assisted to hand expression colostrum to provide for Andria later. Hand expressed 10 ml which was transported to NICU promptly, appropriately labeled and placed in NICU freezer per Bolivar Chopra RN request.     1610  After assisting mother to breastfeed infant A. I assisted her to initiate pumping, to stimulate milk production to provide to infant B. . NICU folder given and reviewed. Discussed importance of pumping after every feeding of infant A. Any EBM collected should be transported to NICU for infant B. Discussed use of pump and care of pump parts, sterilizing of pump parts once per 24 hours with medela steam bags. Discussed when to begin using sterile bottles with each pumping session. Mother denies any questions or concerns regarding lactation at this time. Encouragement and support provided. Lactation belt phone number placed to marker board in mother's room, encouraged her to call for assistance whenever needed.         "

## 2019-12-18 NOTE — NURSING NOTE
Dr. Howard updated on Baby B recurrent decels after nursing interventions. Orders received    Suni Hidalgo RN

## 2019-12-18 NOTE — PLAN OF CARE
Patient stable during recovery peroid. Pain medications administered prior to leaving recovery area. Jennifer (Baby A) taken to NICU for observation due to grunting that had not resolved since delivery. Sanaz (Baby B) admitted to NICU. Patient anxious about status of both babies.

## 2019-12-18 NOTE — ANESTHESIA PROCEDURE NOTES
Labor Epidural      Patient location during procedure: OB  Performed By  CRNA: James Brady CRNA  Preanesthetic Checklist  Completed: patient identified, site marked, surgical consent, pre-op evaluation, timeout performed, IV checked, risks and benefits discussed and monitors and equipment checked  Prep:  Pt Position:sitting  Sterile Tech:cap, gloves and sterile barrier  Prep:chlorhexidine gluconate and isopropyl alcohol  Monitoring:blood pressure monitoring and continuous pulse oximetry  Epidural Block Procedure:  Approach:midline  Guidance:palpation technique  Needle Type:Tuohy  Needle Gauge:18 G  Aspiration:negative  Test Dose:negative  Number of Attempts: 2  Post Assessment:  Dressing:occlusive dressing applied and secured with tape  Pt Tolerance:patient tolerated the procedure well with no apparent complications  Complications:no

## 2019-12-18 NOTE — PLAN OF CARE
Patient on cytotec induction until 2300, then switched to pitocin induction. Patient has an epidural and is reporting no pain. Patient currently has pitocin turned off due to fetal strip. Baby A is vertex, baby B is breech. Plan is to deliver both vaginally. Last SVE at 0319 was 5cm, 90%, -2, mid position, with bloody show. Two baby girls, breastfeeding, ped-EMTALA.

## 2019-12-18 NOTE — LACTATION NOTE
"Mother's Name: Daisy  Phone #:  Infant Name: Jennifer (twin A) :19  Gestation:38w0d  Day of life:0  Birth weight:  7-7.9 (3400g) Discharge weight:  Weight Loss:   24 hour Summary of Feeds:  Voids:  Stools:  Assistive devices (shields, shells, etc):NA  Significant Maternal history:, Twin pregnancy, Anemia, Epilepsy, DM, Thyroid disorder,  first child for \"a couple of months\", youngest child now 6 yrs old  Maternal Concerns:  Denies concerns at this time  Maternal Goal: Exclusive breastfeeding through 1st year  Mother's Medications: Iron, Folic Acid, Synthroid, PNV  Breastpump for home: YES. Mottif  Ped follow up appt:    Called to LDR at 1115 to assist with feeding infant A. Lactation assisting another patient at this time so unable to be present. Requested ADT nurse Meredith Abreu RN to place infant skin to skin with mother, if infant showing hunger cues, Meredith should assist mother to latch. If no hunger cues present, infant may rest skin to skin until lactation available.    Instructed mom our lactation team is here for continued support throughout their breastfeeding journey. Our team has encouraged mom to call with any questions or concerns that may arise after discharge.    1220  To patients bedside, report from Dena Prajapati RN, she was able to assist with latching infant to left breast for 15 min of feeding, when attempting to latch to right breast, infant developed increased respiratory effort with grunting respirations, infant was taken to NICU for transitioning. Infant B admitted to NICU due immediately after delivery. With permission from mother, I assisted her to hand express, collecting 10 ml of colostrum from bilateral breast, left breast expresses more easily at this time. Colostrum immediately transported to NICU, labeled appropriately and placed in NICU freezer per Bolivar Chopra RN request. Infant A has now transitioned and will be rooming in with mother once mother " transfers to 2A. Instructed Jenn Kaur RN to inform mother to breastfeed infant A upon arrival to room.     1437  Requested per Maria Luisa Davis, RN to assist mother with breast feeding infant. She states mother has been independently attempting but unsuccessful, she had instructed mother to keep infant skin to skin while waiting for lactation. Lactation unavailable at this time, I instructed Maria Luisa to assist mother with latching infant and I will follow up once available.    1505  To room to follow up with mother, mother states no one has returned to room to assist with feeding. I assisted mother to change infant's wet diaper, stool smear noted on infant's bottom as well. Assisted mother to position infant to right breast in cross cradle hold. Infant becomes very sleepy at the breast prior to latching. Requiring frequent stimulation. With stimulation, infant began rooting, once gaping wide, assisted to sandwich breast tissue and rolling into infant's mouth, latch achieved and infant began sucking with deep jaw drops, audible swallows noted. Infant again became sleepy very quickly, requiring frequent stimulation. Achieved 15 mins of active feeding to right breast. Demonstrated estefani stimulus to wake infant between breast. Moved infant to left breast in football hold, few attempts required for infant to successfully latch and begin nursing with deep jaw drops, audible swallows again noted. Assisted and recommended compression of breast during feeding. Infant again becomes sleepy at breast very quickly, latch released x1 but easily latched back to breast, infant nursed total of 20 mins. Breast pump set up to beside in room, education provided on pump use and care of pump parts. Initial breastfeeding packet given but only briefly reviewed. Discussion consisting mainly of feeding frequency, adequate voids and stools, signs of adequate feeding and keeping infant active at the breast for adequate feeding. Encouraged  mother to call for assistance whenever needed. Mother verbalizes understanding. Denies further questions or concerns. Advised mother to pump at this time. Mother requested infant to be swaddled and placed in crib so she may pump. Set up to side of bed and began pumping. Encouragement and support provided.

## 2019-12-18 NOTE — OP NOTE
Section Procedure Note    Indications: non-reassuring fetal status, di-di twins, twin b IUGR and abnormal dopplers    Pre-operative Diagnosis: non-reassuring fetal status, di-di twins, twin b IUGR and abnormal dopplers    Post-operative Diagnosis: non-reassuring fetal status, di-di twins, twin b IUGR and abnormal dopplers    Surgeon: Celina Howard MD     Assistants: None    Anesthesia: Epidural anesthesia    ASA Class: 2      Details of Procedure:   The risks, benefits, indications and alternatives of the procedure were reviewed with the patient and informed consent was obtained. The patient was taken to the Operating Room with an IV running. She was placed in the dorsal supine position, with a leftward tilt, then prepped and draped in the usual sterile fashion. Her spinal was noted to not be providing adequate anesthesia after an Allis test was performed,therefore she was put under general. A Pfannenstiel skin incision was made approximately 2cm above the symphysis pubis and extended down sharply to the rectus fascia. The fascia was then nicked in the midline and extended laterally. The muscles of the anterior abdominal wall were . The fascia was then grasped and the fascial incision extended laterally via superior and inferior traction. The peritoneum was next identified, and entered bluntly with the digits. The peritoneal incision was also extended via traction, taking care to note the position of the bladder. Next a bladder blade was inserted. A vesicouterine incision was made with the metzembaum scissors, and a bladder flap gently created. The bladder blade was then reinserted. Next, the uterine incision was made with the scalpel, in transverse, elliptical fashion. The uterine incision was extended laterally via traction. The bladder blade was then removed, and the fetal vertex delivered through the uterine incision without difficulty. Nares and mouth were suctioned on the sterile  field. The remainder of the infant was then delivered, and infant passed to the awaiting NICU team. Next, the placenta was delivered manually, taking care to remove all membranes. The uterus was then exteriorized, and cleared of all clot and debris. The uterine incision was closed with 0-Vicryl in running, locking fashion. Excellent hemostasis was noted. Next, the tubes on each side where grasped with emelyn clamps. A 0-Plain gut suture ligature was transfixed in the mesosalpinx and a suture ligature placed across a knuckle of each tube. A second free tie of plain gut was used and a second suture ligature placed around each tube. The metzembaum scissors were then used to create a window through the mesosalpinx, and the knuckle of tube excised and passed off, after verifying tubal remnants were present in each specimen. The ends of the remaining tubal remnants were then cauterized with excellent hemostasis noted. Copious irrigation of the posterior cul-de-sac was performed with warm, sterile saline. The uterus was returned to the abdominal cavity, and the gutters cleared of all clot and debris. Next interceed was placed over the uterine incision and over the body of the uterus to prevent future adhesions. The fascia was then closed with 0-Vicryl in running fashion. Interceed was also placed over the fascia to prevent adhesions. The skin was subsequently closed with Stratafix suture in 2 layers. The skin was then dressed with Mepilex guaze..    The patient tolerated the procedure well, and sponge, lap and needle counts were correct x 2.    DISPOSITION: stable to the labor & delivery recovery room.       Findings:  Twin A  VFI  In cephalic, OA position, clear,no nuchal cord. Manual extraction of  Normal placenta and cord. Apgars      Khai Herrera [[id number]]           APGARS  One minute Five minutes Ten minutes Fifteen minutes Twenty minutes   Skin color:   1   1           Heart rate:   2   2            Grimace:   2   2            Muscle tone:   2   2            Breathin   2            Totals:   9   9              Twin B  VFI  In cephalic, OA position, meconium stained AF,no nuchal cord. Manual extraction of  Small placenta and cord. Apgars          Khai Herrera [[id number]]           APGARS  One minute Five minutes Ten minutes Fifteen minutes Twenty minutes   Skin color: 0   0   1          Heart rate: 1   1   2          Grimace: 1   1   2           Muscle tone: 0   0   2           Breathin   1   2           Totals: 2   3   9           , Weight:      Khai Herrera [[id number]]   3400 g (7 lb 7.9 oz)     Khai Herrera [[id number]]   1910 g (4 lb 3.4 oz)   Length:      Khai Herrera [[id number]]   20     Khai Herrera [[id number]]   18.75   in. NICU/Nursery Present.    Estimated Blood Loss:  900ml           Drains: 300ml           Total IV Fluids: 2800ml           Specimens: Placenta x 2, portions r/l tubes, gases on twin B, cord blood x 2           Implants: Interceed x 2           Complications:  None; patient tolerated the procedure well.           Disposition: PACU - hemodynamically stable.           Condition: stable    Attending Attestation: I performed the procedure.    Celina Howard MD  2019  1:31 AM

## 2019-12-18 NOTE — NURSING NOTE
Dr. Howard updated on Baby B's lates and reason why cytotec was held due to babies being off the monitor and Baby B's strip. Dr. Howard said it was okay to give next dose of cytotec    Suni Hidalgo RN

## 2019-12-19 LAB
BASOPHILS # BLD AUTO: 0.02 10*3/MM3 (ref 0–0.2)
BASOPHILS NFR BLD AUTO: 0.2 % (ref 0–1.5)
DEPRECATED RDW RBC AUTO: 50.6 FL (ref 37–54)
EOSINOPHIL # BLD AUTO: 0.03 10*3/MM3 (ref 0–0.4)
EOSINOPHIL NFR BLD AUTO: 0.3 % (ref 0.3–6.2)
ERYTHROCYTE [DISTWIDTH] IN BLOOD BY AUTOMATED COUNT: 14.6 % (ref 12.3–15.4)
HCT VFR BLD AUTO: 30.5 % (ref 34–46.6)
HGB BLD-MCNC: 10.5 G/DL (ref 12–15.9)
IMM GRANULOCYTES # BLD AUTO: 0.05 10*3/MM3 (ref 0–0.05)
IMM GRANULOCYTES NFR BLD AUTO: 0.5 % (ref 0–0.5)
LYMPHOCYTES # BLD AUTO: 0.7 10*3/MM3 (ref 0.7–3.1)
LYMPHOCYTES NFR BLD AUTO: 6.8 % (ref 19.6–45.3)
MCH RBC QN AUTO: 32.4 PG (ref 26.6–33)
MCHC RBC AUTO-ENTMCNC: 34.4 G/DL (ref 31.5–35.7)
MCV RBC AUTO: 94.1 FL (ref 79–97)
MONOCYTES # BLD AUTO: 0.6 10*3/MM3 (ref 0.1–0.9)
MONOCYTES NFR BLD AUTO: 5.8 % (ref 5–12)
NEUTROPHILS # BLD AUTO: 8.96 10*3/MM3 (ref 1.7–7)
NEUTROPHILS NFR BLD AUTO: 86.4 % (ref 42.7–76)
NRBC BLD AUTO-RTO: 0 /100 WBC (ref 0–0.2)
PLATELET # BLD AUTO: 156 10*3/MM3 (ref 140–450)
PMV BLD AUTO: 11.3 FL (ref 6–12)
RBC # BLD AUTO: 3.24 10*6/MM3 (ref 3.77–5.28)
WBC NRBC COR # BLD: 10.36 10*3/MM3 (ref 3.4–10.8)

## 2019-12-19 PROCEDURE — 85025 COMPLETE CBC W/AUTO DIFF WBC: CPT | Performed by: OBSTETRICS & GYNECOLOGY

## 2019-12-19 PROCEDURE — 25010000002 CEFEPIME PER 500 MG: Performed by: OBSTETRICS & GYNECOLOGY

## 2019-12-19 RX ORDER — IBUPROFEN 800 MG/1
800 TABLET ORAL EVERY 8 HOURS PRN
Status: DISCONTINUED | OUTPATIENT
Start: 2019-12-19 | End: 2019-12-20 | Stop reason: HOSPADM

## 2019-12-19 RX ADMIN — POLYETHYLENE GLYCOL 3350 17 G: 17 POWDER, FOR SOLUTION ORAL at 10:29

## 2019-12-19 RX ADMIN — OXYCODONE HYDROCHLORIDE AND ACETAMINOPHEN 1 TABLET: 5; 325 TABLET ORAL at 17:24

## 2019-12-19 RX ADMIN — IBUPROFEN 800 MG: 800 TABLET ORAL at 20:37

## 2019-12-19 RX ADMIN — CEFEPIME HYDROCHLORIDE 2 G: 2 INJECTION, POWDER, FOR SOLUTION INTRAVENOUS at 00:35

## 2019-12-19 RX ADMIN — METRONIDAZOLE 500 MG: 500 SOLUTION INTRAVENOUS at 05:39

## 2019-12-19 RX ADMIN — IBUPROFEN 800 MG: 800 TABLET ORAL at 10:32

## 2019-12-19 RX ADMIN — OXYCODONE HYDROCHLORIDE AND ACETAMINOPHEN 1 TABLET: 5; 325 TABLET ORAL at 22:52

## 2019-12-19 RX ADMIN — SODIUM CHLORIDE, PRESERVATIVE FREE: 5 INJECTION INTRAVENOUS at 10:30

## 2019-12-19 RX ADMIN — IBUPROFEN 800 MG: 800 TABLET ORAL at 02:57

## 2019-12-19 RX ADMIN — CEFEPIME HYDROCHLORIDE 2 G: 2 INJECTION, POWDER, FOR SOLUTION INTRAVENOUS at 12:07

## 2019-12-19 RX ADMIN — PRENATAL VIT W/ FE FUMARATE-FA TAB 27-0.8 MG 1 TABLET: 27-0.8 TAB at 10:29

## 2019-12-19 NOTE — PLAN OF CARE
Vss. Ff/ml/u1 scant  lochia. ALT c/d/I with dermabond and binder in place. Cornelia suarez'd @5658..Due to void. IV antibiotics per order. Motrin for pain prn. Breastfeeding, pumping, and supplementing with formula. Bilateral nipples tender and bruised. Lanolin given. Ambulating to NICU to visit twin B. Bonding with twin A appropriately...Significant other at bedside.

## 2019-12-19 NOTE — ANESTHESIA POSTPROCEDURE EVALUATION
Patient: Daisy Herrera    Procedure Summary     Date:  19 Room / Location:  [facility] LABOR DELIVERY 2 /  [facility] LABOR DELIVERY    Anesthesia Start:   Anesthesia Stop:  19 102    Procedure:   SECTION PRIMARY WITH TUBAL (Bilateral Abdomen) Diagnosis:      Surgeon:  Celina Howard MD Provider:  James Brady CRNA    Anesthesia Type:  epidural ASA Status:  2          Anesthesia Type: epidural    Vitals  Vitals Value Taken Time   /79 2019 12:12 PM   Temp 97.3 °F (36.3 °C) 2019 12:12 PM   Pulse 68 2019 12:12 PM   Resp 16 2019 12:12 PM   SpO2 98 % 2019 12:12 PM           Post Anesthesia Care and Evaluation    Patient location during evaluation: floor  Anesthetic complications: No anesthetic complications  Post Neuraxial Block status: Motor and sensory function returned to baseline and No signs or symptoms of PDPH

## 2019-12-19 NOTE — LACTATION NOTE
"Mother's Name: Daisy  Phone #: [phone]  Infant Name: Saul   :19  Gestation:38w0d  Day of life:1  Significant Maternal history:, Twin pregnancy, Anemia, Epilepsy, DM, Thyroid disorder,  first child for \"a couple of months\", youngest child now 6 yrs old  Maternal Concerns:  Denies concerns at this time  Maternal Goal: Exclusive breastfeeding through 1st year  Mother's Medications: Iron, Folic Acid, Synthroid, PNV  Breastpump for home: YES. Mottif  Ped follow up appt:    Patient states she is giving direct breastfeeding a break due to bruising to breast from latching. She states she is pumping every 5-6 hours and has collected 9 ml today. Recommended returning to infant to breast as soon as possible if desired to prevent infant from preferring bottle flow and ease of latch, also to help stimulate milk production in addition to pumping. Reviewed pumping plan, hand expressing, hands on pumping, power pumping, latching techniques, healing of nipples, protecting milk supply, feeding options with BF/EBM/formula. Offered support and encouragement. Recommended viewing educational videos on handout.     Instructed mom our lactation team is here for continued support throughout their breastfeeding journey. Our team has encouraged mom to call with any questions or concerns that may arise after discharge.     "

## 2019-12-19 NOTE — PLAN OF CARE
Problem: Patient Care Overview  Goal: Plan of Care Review  Outcome: Ongoing (interventions implemented as appropriate)  Flowsheets  Taken 12/19/2019 0620 by Dorinda Thompson RN  Progress: improving  Taken 12/19/2019 0855 by Lamar Campuzano RN  Plan of Care Reviewed With: patient;significant other  Taken 12/19/2019 1704 by Lamar Campuzano RN  Outcome Summary: Low transverse incision is well aproximated with clear dressing intact.  No S/S of infection, no drainage present.  Dependent edema is 2+ in ankles and feet.  IV antibiotics given as ordered, pain med give x 1 with some relief.  Mom pumping at bedside for baby A and taking some mild to NICU for Baby B.  Mom is bonding well with baby.  Goal: Individualization and Mutuality  Outcome: Ongoing (interventions implemented as appropriate)  Flowsheets (Taken 12/19/2019 0620 by Dorinda Thompson RN)  Patient Specific Goals (Include Timeframe): successful breastfeeding; adequate milk supply for twins  How to Address Anxieties/Fears: provide emotional support/encouragement; allow for expression of feelings; answer questions as they arrise  Patient Specific Interventions: breastfeeding support and encouragement; assisted pt to NICU; emotional support given  What Information Would Help Us Give You More Personalized Care?: twin A is Jennifer and twin B is Camilia  How Would You and/or Your Support Person Like to Participate in Your Care?: keep involved in all aspects of care  What Anxieties, Fears, Concerns, or Questions Do You Have About Your Care?: Anxious r/t twin B in NICU  Patient Specific Preferences: breastfeeding, pumping, and supplementing  Goal: Discharge Needs Assessment  Outcome: Ongoing (interventions implemented as appropriate)  Flowsheets  Taken 12/19/2019 0620 by Dorinda Thompson RN  Equipment Needed After Discharge: none  Equipment Currently Used at Home: none  Anticipated Changes Related to Illness: none  Transportation Anticipated:  family or friend will provide  Transportation Concerns: car, none  Concerns to be Addressed: no discharge needs identified  Patient/Family Anticipated Services at Transition: none  Taken 2019 1412 by Namrata Prajapati, RN  Concerns Comments: Sanaz (Baby B) in NICU, may be there an extended period  Readmission Within the Last 30 Days: no previous admission in last 30 days  Taken 2019 1010 by Jennifer Lees, RN  Patient/Family Anticipates Transition to: home;home with family  Goal: Interprofessional Rounds/Family Conf  Outcome: Ongoing (interventions implemented as appropriate)     Problem: Postpartum ( Delivery) (Adult,Obstetrics,Pediatric)  Goal: Signs and Symptoms of Listed Potential Problems Will be Absent, Minimized or Managed (Postpartum)  Outcome: Ongoing (interventions implemented as appropriate)  Flowsheets (Taken 2019 1828 by Maria Luisa Davis RN)  Problems Assessed (Postpartum ): all  Problems Present (Postpartum ): none  Goal: Anesthesia/Sedation Recovery  Outcome: Ongoing (interventions implemented as appropriate)     Problem: Breastfeeding (Adult,Obstetrics,Pediatric)  Goal: Signs and Symptoms of Listed Potential Problems Will be Absent, Minimized or Managed (Breastfeeding)  Outcome: Ongoing (interventions implemented as appropriate)  Flowsheets (Taken 2019 0620 by Dorinda Thompson RN)  Problems Assessed (Breastfeeding): all  Problems Present (Breastfeeding): skin breakdown;situational response

## 2019-12-19 NOTE — PROGRESS NOTES
"[facility]   Section Postpartum Delivery Progress Note    Subjective   Postpartum Day 1:  Delivery    The patient feels well.  Her pain is well controlled with prescribed pain medications.   She is ambulating well.  Patient describes her bleeding as moderate lochia.    Breastfeeding: without difficulty.    Objective     Vital Signs Range for the last 24 hours  Temperature: Temp:  [97.7 °F (36.5 °C)-98.7 °F (37.1 °C)] 97.7 °F (36.5 °C)   Temp Source: Temp src: Temporal   BP: BP: (104-143)/(58-91) 136/71   Pulse: Heart Rate:  [] 64   Respirations: Resp:  [16-19] 18   SPO2: SpO2:  [95 %-100 %] 98 %   O2 Amount(L/min):     O2 Devices Device (Oxygen Therapy): room air   Weight:       Admit Height:  Height: 165.1 cm (65\")      Physical Exam:  General:  no acute distresss.  Abdomen: Fundus: appropriate, firm, non tender  Extremities: normal, atraumatic, no cyanosis, and trace edema.   Incision: clean, dry & intact    Lab results reviewed:  Yes   Rubella:  No results found for: RUBELLAIGGIN Nurse Transcribed from prenatal record --  No components found for: EXTRUBELQUAL  Rh Status:    RH type   Date Value Ref Range Status   2019 Positive  Final     Immunizations: There is no immunization history for the selected administration types on file for this patient.    Assessment/Plan       * No active hospital problems. *      Daisy Herrera is Day 1  post-partum       Herrera, VictoriasGirl A [[id number]]   , Low Transverse     Herrera, VictoriasGirl B [[id number]]   , Low Transverse        Herrera, VictoriasGirl A [[id number]]         Herrera, VictoriasGirl B [[id number]]      .      Plan:  Continue current care.      Celina Howard MD  2019  1:34 AM  "

## 2019-12-19 NOTE — LACTATION NOTE
"Mother's Name: Daisy  Phone #: [phone]  Infant Name: Jennifer (twin A) :19  Gestation:38w0d  Day of life:1  Birth weight:  7-7.9 (3400g) Discharge weight:  Weight Loss: -5.7%  24 hour Summary of Feeds: BFx3 EBM 1.1 ml Formula x2  Voids:4 Stools:4  Assistive devices (shields, shells, etc):NA  Significant Maternal history:, Twin pregnancy, Anemia, Epilepsy, DM, Thyroid disorder,  first child for \"a couple of months\", youngest child now 6 yrs old  Maternal Concerns:  Denies concerns at this time  Maternal Goal: Exclusive breastfeeding through 1st year  Mother's Medications: Iron, Folic Acid, Synthroid, PNV  Breastpump for home: YES. Mottif  Ped follow up appt:      Attempted to follow up with mother to discuss breastfeeding progress. Mother and infant not in room at this time.       Instructed mom our lactation team is here for continued support throughout their breastfeeding journey. Our team has encouraged mom to call with any questions or concerns that may arise after discharge.     "

## 2019-12-20 VITALS
SYSTOLIC BLOOD PRESSURE: 129 MMHG | TEMPERATURE: 97.4 F | DIASTOLIC BLOOD PRESSURE: 86 MMHG | BODY MASS INDEX: 35.32 KG/M2 | HEIGHT: 65 IN | HEART RATE: 74 BPM | OXYGEN SATURATION: 99 % | WEIGHT: 212 LBS | RESPIRATION RATE: 20 BRPM

## 2019-12-20 RX ORDER — IBUPROFEN 800 MG/1
800 TABLET ORAL EVERY 8 HOURS PRN
Qty: 90 TABLET | Refills: 2 | Status: SHIPPED | OUTPATIENT
Start: 2019-12-20 | End: 2020-09-25

## 2019-12-20 RX ORDER — OXYCODONE HYDROCHLORIDE AND ACETAMINOPHEN 5; 325 MG/1; MG/1
1 TABLET ORAL EVERY 6 HOURS PRN
Qty: 42 TABLET | Refills: 0 | Status: SHIPPED | OUTPATIENT
Start: 2019-12-20 | End: 2019-12-29

## 2019-12-20 RX ADMIN — IBUPROFEN 800 MG: 800 TABLET ORAL at 14:40

## 2019-12-20 RX ADMIN — PRENATAL VIT W/ FE FUMARATE-FA TAB 27-0.8 MG 1 TABLET: 27-0.8 TAB at 12:53

## 2019-12-20 RX ADMIN — OXYCODONE HYDROCHLORIDE AND ACETAMINOPHEN 1 TABLET: 5; 325 TABLET ORAL at 12:52

## 2019-12-20 RX ADMIN — IBUPROFEN 800 MG: 800 TABLET ORAL at 06:19

## 2019-12-20 RX ADMIN — POLYETHYLENE GLYCOL 3350 17 G: 17 POWDER, FOR SOLUTION ORAL at 12:53

## 2019-12-20 NOTE — PLAN OF CARE
Problem: Patient Care Overview  Goal: Plan of Care Review  Outcome: Ongoing (interventions implemented as appropriate)  Flowsheets (Taken 2019 042)  Progress: improving  Plan of Care Reviewed With: patient; significant other  Outcome Summary: VSS, fundus and lochia wnl, incision wnl, edema 2+ present in bilateral lower extremities, PO pain meds control pain, pumping and supplementing, ambulated to NICU twice, bonding well with , SO supportive at bedside.  Goal: Individualization and Mutuality  Outcome: Ongoing (interventions implemented as appropriate)  Flowsheets  Taken 2019 0425 by Arlin Epstein, RN  Patient Specific Goals (Include Timeframe): pain control, home with healthy infant  Patient Specific Interventions: assist prn with infant care, admister prn pain meds  Taken 2019 06 by Dorinda Thompson RN  Patient Specific Preferences: breastfeeding, pumping, and supplementing  Goal: Discharge Needs Assessment  Outcome: Ongoing (interventions implemented as appropriate)  Flowsheets  Taken 2019 06 by Dorinda Thompson RN  Equipment Needed After Discharge: none  Equipment Currently Used at Home: none  Anticipated Changes Related to Illness: none  Transportation Anticipated: family or friend will provide  Transportation Concerns: car, none  Concerns to be Addressed: no discharge needs identified  Patient/Family Anticipated Services at Transition: none  Taken 2019 1412 by Namrata Prajapati, RN  Concerns Comments: Sanaz (Baby B) in NICU, may be there an extended period  Readmission Within the Last 30 Days: no previous admission in last 30 days  Taken 2019 1010 by Jennifer Lees, RN  Patient/Family Anticipates Transition to: home;home with family  Goal: Interprofessional Rounds/Family Conf  Outcome: Ongoing (interventions implemented as appropriate)     Problem: Postpartum ( Delivery) (Adult,Obstetrics,Pediatric)  Goal: Signs and Symptoms of Listed  Potential Problems Will be Absent, Minimized or Managed (Postpartum)  Outcome: Ongoing (interventions implemented as appropriate)  Flowsheets (Taken 2019 1828 by Maria Luisa Davis, RN)  Problems Assessed (Postpartum ): all  Problems Present (Postpartum ): none  Goal: Anesthesia/Sedation Recovery  Outcome: Ongoing (interventions implemented as appropriate)     Problem: Breastfeeding (Adult,Obstetrics,Pediatric)  Goal: Signs and Symptoms of Listed Potential Problems Will be Absent, Minimized or Managed (Breastfeeding)  Outcome: Ongoing (interventions implemented as appropriate)  Flowsheets (Taken 2019 0620 by Dorinda Thompson, RN)  Problems Assessed (Breastfeeding): all  Problems Present (Breastfeeding): skin breakdown;situational response

## 2019-12-20 NOTE — LACTATION NOTE
"This note was copied from a baby's chart.  Lactation consult conducted with pt.  Twin girls:  A: Jennifer, well baby  B:  Sanaz, NICU    Daisy states she is pumping approx 4-5 times per day. She obtained 40 mls at last session. She notes bf'ed  directly once, \"But she broke me of that. She tried to use me as a pacifier, for an hour and a half.\"  I educated her on keeping infant stimulated and drinking while at breast and removing and holding skin to skin once feed is finished. Pt voiced she plans to pump and give EBM. She does not wish to directly bf. Affirmed her in this. Educated on importance of removing milk to make milk; noting that pumping every 3 hours with a pump that fits properly, should allow her to attain maximum milk production. Pt acknowledged this, but did not voice that she plans to pump more often. Open RTF formula with aprox 20 mls on table in front of mother. Urged pt to call on lactation for support throughout lactation period as needed.    "

## 2019-12-20 NOTE — DISCHARGE SUMMARY
Discharge Summary    [facility]  Daisy Herrera  : 1990  MRN: [id number]  [facility]: [id number]    Date of Admission: 2019   Date of Discharge:  2019   Delivering Physician:    Khai Herrera [[id number]]   Khai Cook [[id number]]             Admission Diagnosis: 1. Normal labor [O80, Z37.9]  2.  delivery delivered [O82]   Discharge Diagnosis: 1. Pregnancy at 38w0d - delivered       Procedures:    Prasad Herreramaría MENA [[id number]]   2019     Prasad Herreramaría FLORES [[id number]]   2019   -      Javier Khai MENA [[id number]]   , Low Transverse     HerreraKhai [[id number]]   , Low Transverse        Hospital Course  Patient is a 29 y.o.  who at 38w0d had a  section.  Her postoperative course was without complications.  On POD #2 she was ready for discharge.  She had normal lochia and pain well controlled with oral medications.    Infant     Khai Herrera [[id number]]   female     Khai Herrera [[id number]]   female   fetus weighing      Khai Herrera [[id number]]   3400 g (7 lb 7.9 oz)     Prasad Herreraclarissakathie FLORES [[id number]]   1910 g (4 lb 3.4 oz)    Apgars -       Khai Herrera [[id number]]   8      Khai Herrera [[id number]]   2   @ 1 minute /       Javier DaisyJacky MENA [[id number]]   9      Khai Herrera [[id number]]   3   @ 5 minutes.    Discharge labs  Lab Results   Component Value Date    WBC 10.36 2019    HGB 10.5 (L) 2019    HCT 30.5 (L) 2019     2019       Discharge Medications     Discharge Medications      New Medications      Instructions Start Date   ibuprofen 800 MG tablet  Commonly known as:  ADVIL,MOTRIN   800 mg, Oral, Every 8 Hours PRN      oxyCODONE-acetaminophen 5-325 MG per tablet  Commonly known as:  PERCOCET   1 tablet, Oral, Every 6 Hours PRN         Continue These Medications       Instructions Start Date   albuterol sulfate  (90 Base) MCG/ACT inhaler  Commonly known as:  PROVENTIL HFA;VENTOLIN HFA;PROAIR HFA   INHALE 2 PUFFS BY MOUTH 4 TIMES DAILY AS NEEDED      ferrous sulfate 325 (65 FE) MG tablet   325 mg, Oral, 2 Times Daily      folic acid 1 MG tablet  Commonly known as:  FOLVITE   1 mg, Oral, Daily      levothyroxine 25 MCG tablet  Commonly known as:  SYNTHROID, LEVOTHROID   25 mcg, Oral, Daily      ONETOUCH DELICA PLUS [id number] misc   No dose, route, or frequency recorded.      PREPLUS 27-1 MG tablet   1 tablet, Oral, Daily             Discharge Disposition Home or Self Care   Condition on Discharge: good   Follow-up: 2 weeks with MD Celina Andrade MD  12/20/2019

## 2019-12-20 NOTE — PROGRESS NOTES
"[facility]   Section Postpartum Delivery Progress Note    Subjective   Postpartum Day 2:  Delivery    The patient feels well.  Her pain is well controlled with prescribed pain medications.   She is ambulating well.  Patient describes her bleeding as thin lochia.    Breastfeeding: without difficulty.    Objective     Vital Signs Range for the last 24 hours  Temperature: Temp:  [97.3 °F (36.3 °C)-98.5 °F (36.9 °C)] 97.4 °F (36.3 °C)   Temp Source: Temp src: Temporal   BP: BP: (124-142)/(77-86) 129/86   Pulse: Heart Rate:  [63-90] 74   Respirations: Resp:  [16-20] 20   SPO2: SpO2:  [97 %-100 %] 99 %   O2 Amount(L/min):     O2 Devices Device (Oxygen Therapy): room air   Weight:       Admit Height:  Height: 165.1 cm (65\")      Physical Exam:  General:  no acute distresss.  Abdomen: Fundus: appropriate, firm, non tender  Extremities: normal, atraumatic, no cyanosis, and trace edema.   Incision: clean, dry & intact    Lab results reviewed:  Yes   Rubella:  No results found for: RUBELLAIGGIN Nurse Transcribed from prenatal record --  No components found for: EXTRUBELQUAL  Rh Status:    RH type   Date Value Ref Range Status   2019 Positive  Final     Immunizations: There is no immunization history for the selected administration types on file for this patient.    Assessment/Plan       * No active hospital problems. *      Daisy Herrera is Day 2  post-partum       Herrera, VictoriasGirl A [[id number]]   , Low Transverse     Herrera, VictoriasGirl B [[id number]]   , Low Transverse        Herrera, VictoriasGirl A [[id number]]         Herrera, VictoriasGirl B [[id number]]      .      Plan:  Discharge home with standard precautions and return to clinic in 4-6 weeks.      Celina Howard MD  2019  7:12 AM  "

## 2019-12-20 NOTE — PLAN OF CARE
Problem: Patient Care Overview  Goal: Plan of Care Review  Outcome: Outcome(s) achieved  Goal: Individualization and Mutuality  Outcome: Outcome(s) achieved  Goal: Discharge Needs Assessment  Outcome: Outcome(s) achieved  Goal: Interprofessional Rounds/Family Conf  Outcome: Outcome(s) achieved     Problem: Postpartum ( Delivery) (Adult,Obstetrics,Pediatric)  Goal: Signs and Symptoms of Listed Potential Problems Will be Absent, Minimized or Managed (Postpartum)  Outcome: Outcome(s) achieved  Goal: Anesthesia/Sedation Recovery  Outcome: Outcome(s) achieved     Problem: Breastfeeding (Adult,Obstetrics,Pediatric)  Goal: Signs and Symptoms of Listed Potential Problems Will be Absent, Minimized or Managed (Breastfeeding)  Outcome: Outcome(s) achieved

## 2019-12-24 LAB
CYTO UR: NORMAL
LAB AP CASE REPORT: NORMAL
PATH REPORT.FINAL DX SPEC: NORMAL
PATH REPORT.GROSS SPEC: NORMAL

## 2020-01-03 DIAGNOSIS — M25.532 LEFT WRIST PAIN: Primary | ICD-10-CM

## 2020-01-07 DIAGNOSIS — M25.532 LEFT WRIST PAIN: ICD-10-CM

## 2020-04-12 DIAGNOSIS — E03.9 ACQUIRED HYPOTHYROIDISM: ICD-10-CM

## 2020-04-13 RX ORDER — LEVOTHYROXINE SODIUM 25 UG/1
TABLET ORAL
Qty: 30 TABLET | Refills: 1 | Status: SHIPPED | OUTPATIENT
Start: 2020-04-13 | End: 2020-07-10

## 2020-07-10 DIAGNOSIS — E03.9 ACQUIRED HYPOTHYROIDISM: ICD-10-CM

## 2020-07-10 RX ORDER — LEVOTHYROXINE SODIUM 25 UG/1
TABLET ORAL
Qty: 30 TABLET | Refills: 0 | Status: SHIPPED | OUTPATIENT
Start: 2020-07-10 | End: 2020-10-16

## 2020-09-25 ENCOUNTER — OFFICE VISIT (OUTPATIENT)
Dept: FAMILY MEDICINE CLINIC | Facility: CLINIC | Age: 30
End: 2020-09-25

## 2020-09-25 VITALS
BODY MASS INDEX: 31.34 KG/M2 | DIASTOLIC BLOOD PRESSURE: 77 MMHG | HEART RATE: 91 BPM | TEMPERATURE: 98.2 F | SYSTOLIC BLOOD PRESSURE: 117 MMHG | HEIGHT: 64 IN | WEIGHT: 183.6 LBS | OXYGEN SATURATION: 97 %

## 2020-09-25 DIAGNOSIS — L29.8 PRURITIC ERYTHEMATOUS RASH: ICD-10-CM

## 2020-09-25 DIAGNOSIS — B88.0 CHIGGER BITES: Primary | ICD-10-CM

## 2020-09-25 PROCEDURE — 99213 OFFICE O/P EST LOW 20 MIN: CPT | Performed by: NURSE PRACTITIONER

## 2020-09-25 RX ORDER — TRIAMCINOLONE ACETONIDE 1 MG/G
CREAM TOPICAL 2 TIMES DAILY
Qty: 45 G | Refills: 0 | Status: SHIPPED | OUTPATIENT
Start: 2020-09-25 | End: 2020-10-16

## 2020-09-25 RX ORDER — PREDNISONE 10 MG/1
TABLET ORAL
Qty: 18 TABLET | Refills: 0 | Status: SHIPPED | OUTPATIENT
Start: 2020-09-25 | End: 2020-10-16

## 2020-09-25 RX ORDER — LEVOCETIRIZINE DIHYDROCHLORIDE 5 MG/1
5 TABLET, FILM COATED ORAL EVERY EVENING
Qty: 10 TABLET | Refills: 0 | Status: SHIPPED | OUTPATIENT
Start: 2020-09-25 | End: 2020-10-16

## 2020-09-25 NOTE — PROGRESS NOTES
"Chief Complaint   Patient presents with   • Blister        Subjective   Daisy Herrera is a 30 y.o.  female who presents today for blistering rash.    HPI:  RASH:  This has happened intermittently since March.  She will get very small blisters that are very \"itchy.\"  Once scratched, they turn into a tiny red rash.  She has them on the ankles, lower legs mainly with a few on the arms and a \"new patch\" on her torso.  They will completely heal up and then she will get several new ones.  She has used some OTC \"itch cream.\"    Daisy Herrera  has a past medical history of Anemia, Epilepsy (CMS/HCC), Gestational diabetes, and Hypothyroidism.    Allergies   Allergen Reactions   • Oxycodone Rash   • Penicillins Unknown - Low Severity     Told as a child she was allergic to penicillins, does not know reaction.       Current Outpatient Medications:   •  albuterol sulfate  (90 Base) MCG/ACT inhaler, INHALE 2 PUFFS BY MOUTH 4 TIMES DAILY AS NEEDED, Disp: , Rfl: 0  •  EUTHYROX 25 MCG tablet, Take 1 tablet by mouth once daily, Disp: 30 tablet, Rfl: 0  •  levocetirizine (Xyzal) 5 MG tablet, Take 1 tablet by mouth Every Evening., Disp: 10 tablet, Rfl: 0  •  predniSONE (DELTASONE) 10 MG tablet, Take 1 TID x 3 days, then BID x 3 days then daily x 3 days, Disp: 18 tablet, Rfl: 0  •  triamcinolone (KENALOG) 0.1 % cream, Apply  topically to the appropriate area as directed 2 (Two) Times a Day., Disp: 45 g, Rfl: 0  Past Medical History:   Diagnosis Date   • Anemia    • Epilepsy (CMS/HCC)     has not had a seizure in 6 years   • Gestational diabetes    • Hypothyroidism      Past Surgical History:   Procedure Laterality Date   •  SECTION WITH TUBAL Bilateral 2019    Procedure:  SECTION PRIMARY WITH TUBAL;  Surgeon: Celina Howard MD;  Location: [facility] LABOR DELIVERY;  Service: Obstetrics/Gynecology   • EAR TUBES     • TONSILLECTOMY       Social History     Socioeconomic History   • " Marital status:      Spouse name: Not on file   • Number of children: Not on file   • Years of education: Not on file   • Highest education level: Not on file   Tobacco Use   • Smoking status: Current Every Day Smoker     Packs/day: 0.50     Years: 7.00     Pack years: 3.50     Types: Cigarettes   • Smokeless tobacco: Never Used   Substance and Sexual Activity   • Alcohol use: No     Frequency: Never     Binge frequency: Never   • Drug use: No   • Sexual activity: Yes     Partners: Male     Birth control/protection: Surgical     Family History   Problem Relation Age of Onset   • Diabetes Father    • Hypertension Father        Family history, surgical history, past medical history, Allergies and med's reviewed with patient today and updated in [employer] EMR.     ROS:  Review of Systems   Constitutional: Negative.  Negative for fatigue, fever and unexpected weight change.   HENT: Negative.  Negative for facial swelling, sore throat and trouble swallowing.    Eyes: Negative.  Negative for photophobia, discharge and visual disturbance.   Respiratory: Negative.  Negative for cough, chest tightness and shortness of breath.    Cardiovascular: Negative.  Negative for chest pain and palpitations.   Gastrointestinal: Negative.  Negative for abdominal pain, diarrhea, nausea and vomiting.   Endocrine: Negative.  Negative for polydipsia, polyphagia and polyuria.   Genitourinary: Negative.  Negative for dysuria, flank pain and frequency.   Musculoskeletal: Negative.  Negative for back pain, gait problem and neck pain.   Skin: Positive for rash.   Allergic/Immunologic: Negative.    Neurological: Negative.  Negative for dizziness, light-headedness and headaches.   Hematological: Negative.    Psychiatric/Behavioral: Negative.  Negative for self-injury and suicidal ideas.       OBJECTIVE:  Vitals:    09/25/20 1446   BP: 117/77   BP Location: Right arm   Patient Position: Sitting   Cuff Size: Adult   Pulse: 91   Temp: 98.2 °F (36.8  "°C)   SpO2: 97%   Weight: 83.3 kg (183 lb 9.6 oz)   Height: 162.6 cm (64\")     Physical Exam  Vitals signs and nursing note reviewed.   Constitutional:       Appearance: Normal appearance.   HENT:      Head: Normocephalic.   Neck:      Musculoskeletal: Normal range of motion and neck supple.   Cardiovascular:      Rate and Rhythm: Normal rate and regular rhythm.      Heart sounds: Normal heart sounds.   Pulmonary:      Effort: Pulmonary effort is normal.      Breath sounds: Normal breath sounds.   Skin:     General: Skin is warm and dry.      Findings: Erythema and rash present.      Comments: Multiple small pruritic vesicles or open erythematous areas consistent with denuded vesicles present on both lower extremities, ankles to knees.  Also a few present on upper extremities and several small vesicles on the upper left chest wall.  There are no areas in the web spaces.  The areas are not linear.   Neurological:      Mental Status: She is alert.         ASSESSMENT/ PLAN:    Daisy was seen today for blister.    Diagnoses and all orders for this visit:    Chigger bites  -     predniSONE (DELTASONE) 10 MG tablet; Take 1 TID x 3 days, then BID x 3 days then daily x 3 days  -     triamcinolone (KENALOG) 0.1 % cream; Apply  topically to the appropriate area as directed 2 (Two) Times a Day.  -     levocetirizine (Xyzal) 5 MG tablet; Take 1 tablet by mouth Every Evening.    Pruritic erythematous rash  -     predniSONE (DELTASONE) 10 MG tablet; Take 1 TID x 3 days, then BID x 3 days then daily x 3 days  -     triamcinolone (KENALOG) 0.1 % cream; Apply  topically to the appropriate area as directed 2 (Two) Times a Day.  -     levocetirizine (Xyzal) 5 MG tablet; Take 1 tablet by mouth Every Evening.        Orders Placed Today:     New Medications Ordered This Visit   Medications   • predniSONE (DELTASONE) 10 MG tablet     Sig: Take 1 TID x 3 days, then BID x 3 days then daily x 3 days     Dispense:  18 tablet     Refill:  0 "   • triamcinolone (KENALOG) 0.1 % cream     Sig: Apply  topically to the appropriate area as directed 2 (Two) Times a Day.     Dispense:  45 g     Refill:  0   • levocetirizine (Xyzal) 5 MG tablet     Sig: Take 1 tablet by mouth Every Evening.     Dispense:  10 tablet     Refill:  0        Management Plan:     An After Visit Summary was printed and given to the patient at discharge.    Follow-up: Return if symptoms worsen or fail to improve.    Marj Chin, APRN 9/25/2020 17:39 CDT  This note was electronically signed.

## 2020-10-15 DIAGNOSIS — E03.9 ACQUIRED HYPOTHYROIDISM: ICD-10-CM

## 2020-10-16 ENCOUNTER — OFFICE VISIT (OUTPATIENT)
Dept: FAMILY MEDICINE CLINIC | Facility: CLINIC | Age: 30
End: 2020-10-16

## 2020-10-16 VITALS
HEART RATE: 88 BPM | WEIGHT: 183.6 LBS | SYSTOLIC BLOOD PRESSURE: 118 MMHG | TEMPERATURE: 96.5 F | OXYGEN SATURATION: 97 % | BODY MASS INDEX: 31.34 KG/M2 | HEIGHT: 64 IN | DIASTOLIC BLOOD PRESSURE: 74 MMHG

## 2020-10-16 DIAGNOSIS — L30.8 PSORIASIFORM DERMATITIS: Primary | ICD-10-CM

## 2020-10-16 DIAGNOSIS — B07.0 PLANTAR WART: ICD-10-CM

## 2020-10-16 PROCEDURE — 99213 OFFICE O/P EST LOW 20 MIN: CPT | Performed by: FAMILY MEDICINE

## 2020-10-16 RX ORDER — CALCIPOTRIENE 50 UG/G
CREAM TOPICAL 2 TIMES DAILY
Qty: 120 G | Refills: 2 | Status: SHIPPED | OUTPATIENT
Start: 2020-10-16 | End: 2020-12-30

## 2020-10-16 RX ORDER — LEVOTHYROXINE SODIUM 25 UG/1
TABLET ORAL
Qty: 30 TABLET | Refills: 5 | Status: SHIPPED | OUTPATIENT
Start: 2020-10-16 | End: 2021-01-22 | Stop reason: DRUGHIGH

## 2020-10-16 NOTE — PROGRESS NOTES
OFFICE VISIT NOTE:    Daisy Sanders is a 30 y.o. female who presents today for Blister (Knees and elbows).     Also, has a plantar wart near the left distal 3rd MT head that has been there for months. Conservative management discussed.     Reports that the steroid pills last time made it flare up worse, and the Kenalog cream burned for a while each time. Prefers to avoid the steroid pills.   Clinically I suspicion that this represents a psoriasiform dermatitis given the location and character of these.     Blister  This is a recurrent problem. The current episode started more than 1 month ago. The problem occurs intermittently. The problem has been gradually worsening. Associated symptoms include a rash. Pertinent negatives include no abdominal pain, chest pain, fatigue or fever. The symptoms are aggravated by stress. She has tried rest for the symptoms. The treatment provided mild relief.        Past medical/surgical history, Family history, Social history, Allergies and Medications have been reviewed with the patient today and are updated in [facility] EMR. See below.  Past Medical History:   Diagnosis Date   • Anemia    • Epilepsy (CMS/HCC)     has not had a seizure in 6 years   • Gestational diabetes    • Hypothyroidism      Past Surgical History:   Procedure Laterality Date   •  SECTION WITH TUBAL Bilateral 2019    Procedure:  SECTION PRIMARY WITH TUBAL;  Surgeon: Celina Howard MD;  Location: [facility] LABOR DELIVERY;  Service: Obstetrics/Gynecology   • EAR TUBES     • TONSILLECTOMY       Family History   Problem Relation Age of Onset   • Diabetes Father    • Hypertension Father      Social History     Tobacco Use   • Smoking status: Current Every Day Smoker     Packs/day: 0.50     Years: 7.00     Pack years: 3.50     Types: Cigarettes   • Smokeless tobacco: Never Used   Substance Use Topics   • Alcohol use: No     Frequency: Never     Binge frequency: Never   • Drug use: No        "      ALLERGIES:  Oxycodone and Penicillins    CURRENT MEDS:    Current Outpatient Medications:   •  calcipotriene (Dovonex) 0.005 % cream, Apply  topically to the appropriate area as directed 2 (Two) Times a Day., Disp: 120 g, Rfl: 2  •  Euthyrox 25 MCG tablet, Take 1 tablet by mouth once daily, Disp: 30 tablet, Rfl: 5    REVIEW OF SYSTEMS:  Review of Systems   Constitutional: Negative for activity change, fatigue, fever, unexpected weight gain and unexpected weight loss.   Respiratory: Negative for shortness of breath.    Cardiovascular: Negative for chest pain.   Gastrointestinal: Negative for abdominal pain.   Genitourinary: Negative for difficulty urinating.   Skin: Positive for rash.   Neurological: Negative for syncope and headache.       PHYSICAL EXAMINATION:  Vital Signs:  /74 (BP Location: Right arm, Patient Position: Sitting, Cuff Size: Adult)   Pulse 88   Temp 96.5 °F (35.8 °C) (Temporal)   Ht 162.6 cm (64.02\")   Wt 83.3 kg (183 lb 9.6 oz)   LMP 09/19/2020 (Exact Date)   SpO2 97%   Breastfeeding No   BMI 31.50 kg/m²   Vitals:    10/16/20 0814   Patient Position: Sitting       Physical Exam  Vitals signs and nursing note reviewed.   Constitutional:       General: She is not in acute distress.     Appearance: She is well-developed.   HENT:      Head: Normocephalic and atraumatic.      Nose: Nose normal.      Mouth/Throat:      Mouth: Mucous membranes are moist.      Pharynx: Oropharynx is clear.   Eyes:      Extraocular Movements: Extraocular movements intact.      Pupils: Pupils are equal, round, and reactive to light.   Neck:      Musculoskeletal: Normal range of motion and neck supple.      Vascular: No JVD.   Cardiovascular:      Rate and Rhythm: Normal rate and regular rhythm.      Pulses: Normal pulses.      Heart sounds: Normal heart sounds.   Pulmonary:      Effort: Pulmonary effort is normal. No respiratory distress.      Breath sounds: Normal breath sounds.   Musculoskeletal: Normal " range of motion.      Right lower leg: No edema.      Left lower leg: No edema.   Skin:     General: Skin is warm and dry.      Capillary Refill: Capillary refill takes less than 2 seconds.      Findings: Rash (psoriasiform rash noted) present.   Neurological:      General: No focal deficit present.      Mental Status: She is alert and oriented to person, place, and time.      Cranial Nerves: No cranial nerve deficit.   Psychiatric:         Mood and Affect: Mood normal.         Behavior: Behavior normal.         Procedures    ASSESSMENT/ PLAN:  Problem List Items Addressed This Visit     None      Visit Diagnoses     Psoriasiform dermatitis    -  Primary    Relevant Medications    calcipotriene (Dovonex) 0.005 % cream    Other Relevant Orders    Ambulatory Referral to Dermatology    Plantar wart        Relevant Medications    calcipotriene (Dovonex) 0.005 % cream            Specific Patient Instructions:  MEDICATION Instructions: Encouraged patient to continue routine medicines as prescribed and maintain compliance. Patient instructed to report any adverse side effects or reactions to medicines promptly to the office. Patient instructed to make us aware of any OTC or herbal meds or supplement use.    DIET Recommendations: Patient instructed and provided information on the following nutrition and diet recommendations: Calorie restriction for weight reduction and maintenance. Necessity for adequate daily intake of fluids/water. Also, diet information provided in AVS for specifics.    EXERCISE Instructions: Discussed with patient the need for routine aerobic activity for cardiovascular fitness, 3 times a week for about 30 minutes. Daily exercise for increased fitness and weight reduction goals.    SMOKING Recommendations: Counseled patient and encouraged them on smoking and tobacco cessation if applicable. Discussed the benefits to all body systems with smoking/tobacco cessation, including decreased  cardiac/lung/stroke/cancer risk. Encouraged no vaping use.    HEALTH MAINTENANCE:  Counseling provided to patient/family about routine health maintenance and ANNUAL physicals/labs. Counseling on recommended Vaccinations appropriate for age needed.        Patient's Body mass index is 31.5 kg/m². BMI is above normal parameters. Recommendations include: exercise counseling and nutrition counseling.      Medications or Orders placed this visit:  Orders Placed This Encounter   Procedures   • Ambulatory Referral to Dermatology     Referral Priority:   Routine     Referral Type:   Consultation     Referral Reason:   Specialty Services Required     Requested Specialty:   Dermatology     Number of Visits Requested:   1       Medications DISCONTINUED this visit:  Medications Discontinued During This Encounter   Medication Reason   • triamcinolone (KENALOG) 0.1 % cream *Therapy completed   • predniSONE (DELTASONE) 10 MG tablet *Therapy completed   • levocetirizine (Xyzal) 5 MG tablet *Therapy completed   • albuterol sulfate  (90 Base) MCG/ACT inhaler *Therapy completed       FOLLOW-UP:  Return for Recheck, Next scheduled follow up.    I discussed the patients findings and my recommendations with patient.  An After Visit Summary (AVS) was printed and given to the patient at discharge.        Simeon Vivas MD, FAAFP  10/16/2020

## 2020-10-16 NOTE — PATIENT INSTRUCTIONS
Psoriasis  Psoriasis is a long-term (chronic) skin condition. It occurs because your immune system causes skin cells to form too quickly. As a result, too many skin cells grow and create raised, red patches (plaques) that often look silvery on your skin. Plaques may show up anywhere on your body. They can be any size or shape. Symptoms of this condition range from mild to very severe.  Psoriasis cannot be passed from one person to another (is not contagious). Sometimes, the symptoms go away and then come back again.  What are the causes?  The cause of psoriasis is not known, but certain factors can make the condition worse. These include:  · Damage or trauma to the skin, such as cuts, scrapes, sunburn, and dryness.  · Not enough exposure to sunlight.  · Certain medicines.  · Alcohol.  · Tobacco use.  · Stress.  · Infections caused by bacteria or viruses.  What increases the risk?  You are more likely to develop this condition if you:  · Have a family history of psoriasis.  · Are obese.  · Are 20-40 years old.  · Are taking certain medicines.  What are the signs or symptoms?  There are different types of psoriasis. You can have more than one type of psoriasis during your life. The types are:  · Plaque. This is the most common.  · Guttate. This is also called eruptive psoriasis.  · Inverse.  · Pustular.  · Erythrodermic.  · Sebopsoriasis.  · Psoriatic arthritis.  Each type of psoriasis has different symptoms.  · Plaque psoriasis symptoms include red, raised plaques with a silvery-white coating (scale). These plaques may be itchy. Your nails may be pitted and crumbly or fall off.  · Guttate psoriasis symptoms include small red spots that often show up on your trunk, arms, and legs. These spots may develop after you have been sick, especially with strep throat.  · Inverse psoriasis symptoms include plaques in your underarm area, under your breasts, or on your genitals, groin, or buttocks.  · Pustular psoriasis symptoms  include pus-filled bumps that are painful, red, and swollen on the palms of your hands or the soles of your feet. You also may feel exhausted, feverish, weak, or have no appetite.  · Erythrodermic psoriasis symptoms include bright red skin that may look burned. You may have a fast heartbeat and a body temperature that is too high or too low. You may be itchy or in pain.  · Sebopsoriasis symptoms include red plaques that have a greasy coating, and are often on your scalp, forehead, and face.  · Psoriatic arthritis causes swollen, painful joints along with scaly skin plaques.  How is this diagnosed?  This condition is diagnosed based on your symptoms, family history, and a physical exam.  · You may also be referred to a health care provider who specializes in skin diseases (dermatologist).  · Your health care provider may remove a tissue sample (biopsy) for testing.  How is this treated?  There is no cure for this condition, but treatment can help manage it. Goals of treatment include:  · Helping your skin heal.  · Reducing itching and inflammation.  · Slowing the growth of new skin cells.  · Helping your immune system respond better to your skin.  Treatment varies, depending on the severity of your condition. This condition may be treated by:  · Creams or ointments to help with symptoms.  · Ultraviolet ray exposure (light therapy or phototherapy). This may include natural sunlight or light therapy in a medical office.  · Medicines (systemic therapy). These medicines can help your body better manage skin cell turnover and inflammation. Medicines may be given in the form of pills or injections. They may be used along with light therapy or ointments. You may also get antibiotic medicines if you have an infection.  Follow these instructions at home:  Skin Care  · Moisturize your skin as needed. Only use moisturizers that have been approved by your health care provider.  · Apply cool, wet cloths (cold compresses) to the  affected areas.  · Do not use a hot tub or take hot showers. Take lukewarm showers and baths.  · Do not scratch your skin.  Lifestyle    · Do not use any products that contain nicotine or tobacco, such as cigarettes, e-cigarettes, and chewing tobacco. If you need help quitting, ask your health care provider.  · Use techniques for stress reduction, such as meditation or yoga.  · Maintain a healthy weight. Follow instructions from your health care provider for weight control. These may include dietary restrictions.  · Get safe exposure to the sun as told by your health care provider. This may include spending regular intervals of time outdoors in sunlight. Do not get sunburned.  · Consider joining a psoriasis support group.  Medicines  · Take or use over-the-counter and prescription medicines only as told by your health care provider.  · If you were prescribed an antibiotic medicine, take it as told by your health care provider. Do not stop using the antibiotic even if you start to feel better.  Alcohol use  · Limit how much you use:  ? 0-1 drink a day for women.  ? 0-2 drinks a day for men.  · Be aware of how much alcohol is in your drink. In the [place] one drink equals one 12 oz bottle of beer (355 mL), one 5 oz glass of wine (148 mL), or one 1½ oz glass of hard liquor (44 mL).  General instructions  · Keep a journal to help track what triggers an outbreak. Try to avoid any triggers.  · See a counselor if feelings of sadness, frustration, and hopelessness about your condition are interfering with your work and relationships.  · Keep all follow-up visits as told by your health care provider. This is important.  Contact a health care provider if:  · You have a fever.  · Your pain gets worse.  · You have increasing redness or warmth in the affected areas.  · You have new or worsening pain or stiffness in your joints.  · Your nails start to break easily or pull away from the nail bed.  · You feel  depressed.  Summary  · Psoriasis is a long-term (chronic) skin condition. Patches (plaques) may show up anywhere on your body.  · There is no cure for this condition, but treatment can help manage it. Treatment varies, depending on the severity of your condition.  · Keep a journal to track what triggers an outbreak. Try to avoid any triggers.  · Take or use over-the-counter and prescription medicines only as told by your health care provider.  · Keep all follow-up visits as told by your health care provider. This is important.  This information is not intended to replace advice given to you by your health care provider. Make sure you discuss any questions you have with your health care provider.  Document Released: 12/15/2001 Document Revised: 10/22/2019 Document Reviewed: 10/22/2019  Elsevier Patient Education © 2020 [employer]

## 2020-10-23 ENCOUNTER — TELEPHONE (OUTPATIENT)
Dept: FAMILY MEDICINE CLINIC | Facility: CLINIC | Age: 30
End: 2020-10-23

## 2020-10-23 DIAGNOSIS — L30.8 PSORIASIFORM DERMATITIS: Primary | ICD-10-CM

## 2020-10-23 NOTE — TELEPHONE ENCOUNTER
calcipotriene (Dovonex) 0.005 % cream    This medication has been denied by Patient's insurance. Can you please send in something that would be covered for Psoriasiform dermatitis? Patient has never tried any medication for this DX.     WM Pton.   I will call patient back once this is complete. Thanks!

## 2020-10-23 NOTE — TELEPHONE ENCOUNTER
Alternative sent to pharmacy.  It is OTC, but I don't see an alternative that her insurance will cover.

## 2020-10-27 NOTE — TELEPHONE ENCOUNTER
Pt called, stated that second medication sent to pharmacy is not covered by her insurance either. She is requesting a different medication be sent to [facility] [place].

## 2020-10-29 RX ORDER — CLOBETASOL PROPIONATE 0.5 MG/ML
LOTION TOPICAL 2 TIMES DAILY
Qty: 118 ML | Refills: 2 | Status: SHIPPED | OUTPATIENT
Start: 2020-10-29 | End: 2020-12-30

## 2020-12-30 ENCOUNTER — OFFICE VISIT (OUTPATIENT)
Dept: FAMILY MEDICINE CLINIC | Facility: CLINIC | Age: 30
End: 2020-12-30

## 2020-12-30 VITALS
WEIGHT: 177.6 LBS | HEIGHT: 63 IN | OXYGEN SATURATION: 100 % | SYSTOLIC BLOOD PRESSURE: 114 MMHG | DIASTOLIC BLOOD PRESSURE: 71 MMHG | HEART RATE: 75 BPM | TEMPERATURE: 97.5 F | BODY MASS INDEX: 31.47 KG/M2

## 2020-12-30 DIAGNOSIS — Z00.00 ANNUAL PHYSICAL EXAM: Primary | ICD-10-CM

## 2020-12-30 DIAGNOSIS — E03.9 ACQUIRED HYPOTHYROIDISM: Primary | ICD-10-CM

## 2020-12-30 DIAGNOSIS — E03.9 ACQUIRED HYPOTHYROIDISM: ICD-10-CM

## 2020-12-30 DIAGNOSIS — R51.9 INTERMITTENT HEADACHE: ICD-10-CM

## 2020-12-30 DIAGNOSIS — R42 DIZZINESS: ICD-10-CM

## 2020-12-30 DIAGNOSIS — R53.83 FATIGUE, UNSPECIFIED TYPE: ICD-10-CM

## 2020-12-30 PROCEDURE — 99213 OFFICE O/P EST LOW 20 MIN: CPT | Performed by: NURSE PRACTITIONER

## 2020-12-30 NOTE — PROGRESS NOTES
Chief Complaint   Patient presents with   • Facial Pain        Subjective   Daisy Sanders is a 30 y.o.  female who presents today for facial pain.    HPI:  Patient had 3 weeks worth of intermittent headache (center of forehead) that would radiate to both ears.  It was accompanied by ear pain and dizziness.  It would last several minutes to a few hours, then resolve.  This happened at least daily.  For the past few days, she has not had any symptoms.  She did check her b/p during this period and it remained normal.  She did have one high reading of 146/90.  She took Ibuprofen and it offered no relief.    Daisy Sanders  has a past medical history of Anemia, Epilepsy (CMS/HCC), Gestational diabetes, and Hypothyroidism.    Allergies   Allergen Reactions   • Oxycodone Rash   • Penicillins Unknown - Low Severity     Told as a child she was allergic to penicillins, does not know reaction.       Current Outpatient Medications:   •  Coal Tar Extract (Psoriasin) 2 % ointment, Apply 1 each topically 3 (Three) Times a Day., Disp: 113 g, Rfl: 1  •  Euthyrox 25 MCG tablet, Take 1 tablet by mouth once daily, Disp: 30 tablet, Rfl: 5  Past Medical History:   Diagnosis Date   • Anemia    • Epilepsy (CMS/HCC)     has not had a seizure in 6 years   • Gestational diabetes    • Hypothyroidism      Past Surgical History:   Procedure Laterality Date   •  SECTION WITH TUBAL Bilateral 2019    Procedure:  SECTION PRIMARY WITH TUBAL;  Surgeon: Celina Howard MD;  Location: [facility] LABOR DELIVERY;  Service: Obstetrics/Gynecology   • EAR TUBES     • TONSILLECTOMY       Social History     Socioeconomic History   • Marital status:      Spouse name: Not on file   • Number of children: Not on file   • Years of education: Not on file   • Highest education level: Not on file   Tobacco Use   • Smoking status: Current Every Day Smoker     Packs/day: 1.00     Years: 7.00     Pack years: 7.00      "Types: Cigarettes   • Smokeless tobacco: Never Used   Substance and Sexual Activity   • Alcohol use: No     Frequency: Never     Binge frequency: Never   • Drug use: No   • Sexual activity: Yes     Partners: Male     Birth control/protection: Surgical     Family History   Problem Relation Age of Onset   • Diabetes Father    • Hypertension Father        Family history, surgical history, past medical history, Allergies and med's reviewed with patient today and updated in [facility] EMR.     ROS:  Review of Systems   Constitutional: Negative.  Negative for fatigue, fever and unexpected weight change.   HENT: Positive for ear pain. Negative for facial swelling, sore throat and trouble swallowing.    Eyes: Negative.  Negative for photophobia, discharge and visual disturbance.   Respiratory: Negative.  Negative for cough, chest tightness and shortness of breath.    Cardiovascular: Negative.  Negative for chest pain and palpitations.   Gastrointestinal: Negative.  Negative for abdominal pain, diarrhea, nausea and vomiting.   Endocrine: Negative.  Negative for polydipsia, polyphagia and polyuria.   Genitourinary: Negative.  Negative for dysuria, flank pain and frequency.   Musculoskeletal: Negative.  Negative for back pain, gait problem and neck pain.   Skin: Negative.  Negative for rash.   Allergic/Immunologic: Negative.    Neurological: Positive for dizziness, light-headedness and headaches.   Hematological: Negative.    Psychiatric/Behavioral: Negative.  Negative for self-injury and suicidal ideas.       OBJECTIVE:  Vitals:    12/30/20 1213   BP: 114/71   BP Location: Right arm   Patient Position: Sitting   Cuff Size: Adult   Pulse: 75   Temp: 97.5 °F (36.4 °C)   SpO2: 100%   Weight: 80.6 kg (177 lb 9.6 oz)   Height: 160 cm (63\")     Physical Exam  Vitals signs and nursing note reviewed.   Constitutional:       General: She is not in acute distress.     Appearance: Normal appearance. She is well-developed. She is obese. She is " not diaphoretic.   HENT:      Head: Normocephalic and atraumatic.      Right Ear: Tympanic membrane, ear canal and external ear normal.      Left Ear: Tympanic membrane, ear canal and external ear normal.      Nose: Nose normal.      Mouth/Throat:      Mouth: Mucous membranes are moist.      Pharynx: Oropharynx is clear.   Eyes:      Conjunctiva/sclera: Conjunctivae normal.      Pupils: Pupils are equal, round, and reactive to light.   Neck:      Musculoskeletal: Normal range of motion and neck supple.   Cardiovascular:      Rate and Rhythm: Normal rate and regular rhythm.      Heart sounds: Normal heart sounds. No murmur.   Pulmonary:      Effort: Pulmonary effort is normal. No respiratory distress.      Breath sounds: Normal breath sounds.   Musculoskeletal: Normal range of motion.   Skin:     General: Skin is warm and dry.      Capillary Refill: Capillary refill takes less than 2 seconds.      Findings: No erythema.   Neurological:      Mental Status: She is alert and oriented to person, place, and time.   Psychiatric:         Behavior: Behavior normal.         Thought Content: Thought content normal.         Judgment: Judgment normal.         ASSESSMENT/ PLAN:    Diagnoses and all orders for this visit:    1. Acquired hypothyroidism (Primary)    2. Dizziness    3. Intermittent headache    No current symptoms.  Further recommendations will be based upon lab results.    Orders Placed Today:     No orders of the defined types were placed in this encounter.       Management Plan:     An After Visit Summary was printed and given to the patient at discharge.    Follow-up: Return in about 16 days (around 1/15/2021) for Annual physical with labs prior (1/8/21).    STEFANIA Cosme 12/30/2020 12:34 CST  This note was electronically signed.

## 2021-01-08 ENCOUNTER — CLINICAL SUPPORT (OUTPATIENT)
Dept: FAMILY MEDICINE CLINIC | Facility: CLINIC | Age: 31
End: 2021-01-08

## 2021-01-22 ENCOUNTER — OFFICE VISIT (OUTPATIENT)
Dept: FAMILY MEDICINE CLINIC | Facility: CLINIC | Age: 31
End: 2021-01-22

## 2021-01-22 VITALS
OXYGEN SATURATION: 99 % | HEART RATE: 89 BPM | WEIGHT: 178.6 LBS | HEIGHT: 63 IN | TEMPERATURE: 97.7 F | DIASTOLIC BLOOD PRESSURE: 70 MMHG | BODY MASS INDEX: 31.64 KG/M2 | SYSTOLIC BLOOD PRESSURE: 111 MMHG

## 2021-01-22 DIAGNOSIS — E03.9 ACQUIRED HYPOTHYROIDISM: ICD-10-CM

## 2021-01-22 DIAGNOSIS — Z00.00 ANNUAL PHYSICAL EXAM: Primary | ICD-10-CM

## 2021-01-22 PROCEDURE — 99213 OFFICE O/P EST LOW 20 MIN: CPT | Performed by: NURSE PRACTITIONER

## 2021-01-22 RX ORDER — LEVOTHYROXINE SODIUM 0.05 MG/1
25 TABLET ORAL DAILY
Qty: 90 TABLET | Refills: 5 | Status: CANCELLED | OUTPATIENT
Start: 2021-01-22 | End: 2021-04-22

## 2021-01-22 RX ORDER — LEVOTHYROXINE SODIUM 0.05 MG/1
50 TABLET ORAL DAILY
Qty: 90 TABLET | Refills: 1 | Status: SHIPPED | OUTPATIENT
Start: 2021-01-22 | End: 2022-01-19

## 2021-01-22 NOTE — PROGRESS NOTES
"Chief Complaint  Annual Exam (no pap)    Subjective          Daisy Sanders presents to [facility] for   History of Present Illness  PATIENT CONCERNS:    Patient denies any increase in fatigue. She says she does not feel symptomatic like she did prior to starting euthyrox. She also mentions that she used to be on 50 mcg, however, her previous PCP told her that was too high of a dose and decreased her back to 25 mcg. She notes that she never developed any symptoms either when her dose was \"too high\". She does smoke and has been trying to stop, she is down to .5 pack daily. Review of systems covered with patient with no additional abnormalities reported. On review of labs, patient remarked that she did have 2 cups of coffee that morning with cream and sugar, hence the elevation in blood glucose and lipid abnormalities.     Objective   Vital Signs:   /70 (BP Location: Right arm, Patient Position: Sitting, Cuff Size: Adult)   Pulse 89   Temp 97.7 °F (36.5 °C)   Ht 160 cm (63\") Comment: pt reported  Wt 81 kg (178 lb 9.6 oz)   SpO2 99%   BMI 31.64 kg/m²       Physical Exam  Vitals signs and nursing note reviewed.   Constitutional:       General: She is not in acute distress.     Appearance: Normal appearance. She is normal weight. She is not ill-appearing, toxic-appearing or diaphoretic.   HENT:      Head: Normocephalic and atraumatic.      Right Ear: Tympanic membrane, ear canal and external ear normal. There is no impacted cerumen.      Left Ear: Tympanic membrane, ear canal and external ear normal. There is no impacted cerumen.      Nose: Nose normal. No congestion or rhinorrhea.      Mouth/Throat:      Mouth: Mucous membranes are dry.      Pharynx: Oropharynx is clear. No oropharyngeal exudate or posterior oropharyngeal erythema.   Eyes:      General: No scleral icterus.        Right eye: No discharge.         Left eye: No discharge.      Extraocular Movements: " Extraocular movements intact.      Conjunctiva/sclera: Conjunctivae normal.      Pupils: Pupils are equal, round, and reactive to light.   Neck:      Musculoskeletal: Normal range of motion and neck supple. No neck rigidity or muscular tenderness.      Vascular: No carotid bruit.   Cardiovascular:      Rate and Rhythm: Normal rate.      Pulses: Normal pulses.      Heart sounds: Normal heart sounds. No murmur. No friction rub. No gallop.    Pulmonary:      Effort: Pulmonary effort is normal. No respiratory distress.      Breath sounds: Normal breath sounds. No stridor. No wheezing, rhonchi or rales.   Chest:      Chest wall: No tenderness.   Abdominal:      General: Abdomen is flat. Bowel sounds are normal. There is no distension.      Palpations: Abdomen is soft. There is no mass.      Tenderness: There is no abdominal tenderness. There is no guarding or rebound.      Hernia: No hernia is present.   Genitourinary:     Comments: Per GYN.  Musculoskeletal: Normal range of motion.         General: No swelling, tenderness, deformity or signs of injury.      Right lower leg: No edema.      Left lower leg: No edema.   Lymphadenopathy:      Cervical: No cervical adenopathy.   Skin:     General: Skin is warm and dry.      Capillary Refill: Capillary refill takes less than 2 seconds.      Coloration: Skin is not jaundiced or pale.      Findings: No bruising, erythema, lesion or rash.   Neurological:      General: No focal deficit present.      Mental Status: She is alert and oriented to person, place, and time. Mental status is at baseline.      Cranial Nerves: No cranial nerve deficit.      Sensory: No sensory deficit.      Motor: No weakness.      Coordination: Coordination normal.      Gait: Gait normal.   Psychiatric:         Mood and Affect: Mood normal.         Behavior: Behavior normal.         Thought Content: Thought content normal.         Judgment: Judgment normal.        Result Review :                 Assessment  and Plan    Problem List Items Addressed This Visit     None      Visit Diagnoses     Annual physical exam    -  Primary    Acquired hypothyroidism        Relevant Medications    levothyroxine (Synthroid) 50 MCG tablet    BMI 31.0-31.9,adult          Patient urged to partake of healthy diet rich in fresh fruits and vegetables as well as lean proteins.  Patient encouraged to make time daily for exercise with goal of 30 min daily of moderate impact.  Patient encouraged to continue social distancing and use of mask in public.    Follow Up   Return in about 1 year (around 1/22/2022) for Annual physical with labs prior.  Patient was given instructions and counseling regarding her condition or for health maintenance advice. Please see specific information pulled into the AVS if appropriate.

## 2021-02-04 ENCOUNTER — TELEMEDICINE (OUTPATIENT)
Dept: FAMILY MEDICINE CLINIC | Facility: CLINIC | Age: 31
End: 2021-02-04

## 2021-02-04 VITALS — BODY MASS INDEX: 31.54 KG/M2 | WEIGHT: 178 LBS | TEMPERATURE: 98.6 F | HEIGHT: 63 IN

## 2021-02-04 DIAGNOSIS — J06.9 UPPER RESPIRATORY TRACT INFECTION, UNSPECIFIED TYPE: Primary | ICD-10-CM

## 2021-02-04 PROCEDURE — 99213 OFFICE O/P EST LOW 20 MIN: CPT | Performed by: NURSE PRACTITIONER

## 2021-02-04 RX ORDER — GUAIFENESIN 600 MG/1
1200 TABLET, EXTENDED RELEASE ORAL 2 TIMES DAILY
Start: 2021-02-04 | End: 2021-07-23

## 2021-02-04 NOTE — PROGRESS NOTES
CC: congestion    History:  Daisy Sanders is a 30 y.o. female who presents today for evaluation of the above problems.    Symptoms started over the weekend. She has congestion and sore throat.  No cough, but does have headache.  No change in taste or smell.  She is a smoker. All 3 of her kids also have symptoms.    HPI  ROS:  Review of Systems   Constitutional: Negative for fever.   HENT: Positive for congestion and sore throat.    Respiratory: Negative for cough.    Gastrointestinal: Negative for diarrhea.   Musculoskeletal: Negative for myalgias.   Neurological: Positive for headaches. Negative for dizziness and light-headedness.       Allergies   Allergen Reactions   • Oxycodone Rash   • Penicillins Unknown - Low Severity     Told as a child she was allergic to penicillins, does not know reaction.     Past Medical History:   Diagnosis Date   • Anemia    • Epilepsy (CMS/HCC)     has not had a seizure in 6 years   • Gestational diabetes    • Hypothyroidism      Past Surgical History:   Procedure Laterality Date   •  SECTION WITH TUBAL Bilateral 2019    Procedure:  SECTION PRIMARY WITH TUBAL;  Surgeon: Celina Howard MD;  Location: [facility] LABOR DELIVERY;  Service: Obstetrics/Gynecology   • EAR TUBES     • TONSILLECTOMY       Family History   Problem Relation Age of Onset   • Diabetes Father    • Hypertension Father       reports that she has been smoking cigarettes. She has a 3.50 pack-year smoking history. She has never used smokeless tobacco. She reports that she does not drink alcohol or use drugs.      Current Outpatient Medications:   •  levothyroxine (Synthroid) 50 MCG tablet, Take 1 tablet by mouth Daily., Disp: 90 tablet, Rfl: 1  •  Coal Tar Extract (Psoriasin) 2 % ointment, Apply 1 each topically 3 (Three) Times a Day., Disp: 113 g, Rfl: 1  •  guaiFENesin (Mucinex) 600 MG 12 hr tablet, Take 2 tablets by mouth 2 (Two) Times a Day., Disp: , Rfl:     OBJECTIVE:  Temp 98.6  "°F (37 °C) Comment: pt reported  Ht 160 cm (63\") Comment: pt reported  Wt 80.7 kg (178 lb) Comment: pt reported  BMI 31.53 kg/m²    Physical Exam  Constitutional:       Appearance: Normal appearance. She is not ill-appearing.   Neurological:      Mental Status: She is alert.   Psychiatric:         Mood and Affect: Mood normal.         Speech: Speech normal.         Assessment/Plan    Diagnoses and all orders for this visit:    1. Upper respiratory tract infection, unspecified type (Primary)  -     guaiFENesin (Mucinex) 600 MG 12 hr tablet; Take 2 tablets by mouth 2 (Two) Times a Day.    Increase fluids.  Saline nasal spray.   If no improvement by Monday contact office.     This visit was completed via secure Zoom connection.       An After Visit Summary was printed and given to the patient at discharge.  Return if symptoms worsen or fail to improve.       STEFANIA Salazar 2/4/21    Electronically signed.  "

## 2021-06-01 ENCOUNTER — TELEPHONE (OUTPATIENT)
Dept: FAMILY MEDICINE CLINIC | Facility: CLINIC | Age: 31
End: 2021-06-01

## 2021-06-01 NOTE — TELEPHONE ENCOUNTER
Attempted to speak with patient about this to advise what provider suggested and patient hung up on me during conversation. Attempted to reach out again and call was forwarded.

## 2021-06-01 NOTE — TELEPHONE ENCOUNTER
Patient states her thyroid medication is causing her to have mood swings. I advised her to make an appointment to discuss her medications with Dr. Vivas or Marj. She refused at this time.

## 2021-06-01 NOTE — TELEPHONE ENCOUNTER
That would certainly be atypical for thyroid medication to cause mood issues.  I also recommend an appointment.

## 2021-07-20 ENCOUNTER — TELEPHONE (OUTPATIENT)
Dept: FAMILY MEDICINE CLINIC | Facility: CLINIC | Age: 31
End: 2021-07-20

## 2021-07-23 ENCOUNTER — OFFICE VISIT (OUTPATIENT)
Dept: FAMILY MEDICINE CLINIC | Facility: CLINIC | Age: 31
End: 2021-07-23

## 2021-07-23 VITALS
DIASTOLIC BLOOD PRESSURE: 65 MMHG | SYSTOLIC BLOOD PRESSURE: 107 MMHG | OXYGEN SATURATION: 97 % | TEMPERATURE: 98 F | HEIGHT: 64 IN | HEART RATE: 86 BPM | BODY MASS INDEX: 29.43 KG/M2 | WEIGHT: 172.4 LBS

## 2021-07-23 DIAGNOSIS — R10.31 ABDOMINAL PAIN, RLQ: Primary | ICD-10-CM

## 2021-07-23 DIAGNOSIS — E03.9 ACQUIRED HYPOTHYROIDISM: ICD-10-CM

## 2021-07-23 PROCEDURE — 99214 OFFICE O/P EST MOD 30 MIN: CPT | Performed by: FAMILY MEDICINE

## 2021-07-29 NOTE — PLAN OF CARE
Problem: Patient Care Overview  Goal: Plan of Care Review  Outcome: Ongoing (interventions implemented as appropriate)  Flowsheets (Taken 12/18/2019 7134)  Progress: no change  Plan of Care Reviewed With: patient; mother  Outcome Summary: ALT incision, prineo dressing, binder on, llanes cath to BSD, sits on edge of bed, SCD's on, IV infusing with abx.      no

## 2021-10-12 DIAGNOSIS — J20.8 VIRAL BRONCHITIS: Primary | ICD-10-CM

## 2021-10-12 RX ORDER — METHYLPREDNISOLONE 4 MG/1
TABLET ORAL
Qty: 1 EACH | Refills: 0 | Status: SHIPPED | OUTPATIENT
Start: 2021-10-12 | End: 2022-04-21

## 2022-01-14 ENCOUNTER — CLINICAL SUPPORT (OUTPATIENT)
Dept: FAMILY MEDICINE CLINIC | Facility: CLINIC | Age: 32
End: 2022-01-14

## 2022-01-14 DIAGNOSIS — R53.83 FATIGUE, UNSPECIFIED TYPE: Primary | ICD-10-CM

## 2022-01-15 LAB
T4 SERPL-MCNC: 6.6 UG/DL (ref 4.5–12)
TSH SERPL DL<=0.005 MIU/L-ACNC: 14.3 UIU/ML (ref 0.45–4.5)

## 2022-01-19 DIAGNOSIS — E03.9 ACQUIRED HYPOTHYROIDISM: ICD-10-CM

## 2022-01-19 RX ORDER — LEVOTHYROXINE SODIUM 88 UG/1
88 TABLET ORAL DAILY
Qty: 30 TABLET | Refills: 2 | Status: SHIPPED | OUTPATIENT
Start: 2022-01-19 | End: 2022-04-22

## 2022-04-21 ENCOUNTER — OFFICE VISIT (OUTPATIENT)
Dept: FAMILY MEDICINE CLINIC | Facility: CLINIC | Age: 32
End: 2022-04-21

## 2022-04-21 VITALS
HEIGHT: 64 IN | SYSTOLIC BLOOD PRESSURE: 95 MMHG | OXYGEN SATURATION: 98 % | TEMPERATURE: 97.8 F | HEART RATE: 92 BPM | BODY MASS INDEX: 31.99 KG/M2 | WEIGHT: 187.4 LBS | DIASTOLIC BLOOD PRESSURE: 64 MMHG

## 2022-04-21 DIAGNOSIS — E78.2 HYPERLIPIDEMIA, MIXED: ICD-10-CM

## 2022-04-21 DIAGNOSIS — E03.9 ACQUIRED HYPOTHYROIDISM: Primary | ICD-10-CM

## 2022-04-21 PROCEDURE — 99214 OFFICE O/P EST MOD 30 MIN: CPT | Performed by: NURSE PRACTITIONER

## 2022-04-21 RX ORDER — ATORVASTATIN CALCIUM 20 MG/1
20 TABLET, FILM COATED ORAL DAILY
Qty: 90 TABLET | Refills: 3 | Status: SHIPPED | OUTPATIENT
Start: 2022-04-21

## 2022-04-21 NOTE — PROGRESS NOTES
"Chief Complaint   Patient presents with   • Eye Problem     Left eye issue   • Annual Exam        Subjective   Daisy Sanders is a 32 y.o. female who presents today for left eye and fu labs, hyperlipidemia, hypothyroidism.    Eye Problem   The left eye is affected. This is a recurrent problem. The current episode started 1 to 4 weeks ago. The problem occurs intermittently. The problem has been waxing and waning. She does not wear contacts. She has tried nothing for the symptoms.      New finding of hyperlipidemia LDL-143.Family history of CAD.  TSH-3.950  All labs reviewed with patient.  Allergies   Allergen Reactions   • Oxycodone Rash   • Penicillins Unknown - Low Severity     Told as a child she was allergic to penicillins, does not know reaction.         OBJECTIVE:  Vitals:    04/21/22 0801   BP: 95/64   BP Location: Right arm   Patient Position: Sitting   Cuff Size: Adult   Pulse: 92   Temp: 97.8 °F (36.6 °C)   TempSrc: Infrared   SpO2: 98%   Weight: 85 kg (187 lb 6.4 oz)   Height: 162.6 cm (64.02\")     Physical Exam  Vitals and nursing note reviewed.   Constitutional:       Appearance: Normal appearance.   HENT:      Head: Normocephalic and atraumatic.      Nose: Nose normal.      Mouth/Throat:      Mouth: Mucous membranes are moist.   Eyes:      Extraocular Movements: Extraocular movements intact.      Pupils: Pupils are equal, round, and reactive to light.   Cardiovascular:      Rate and Rhythm: Normal rate and regular rhythm.      Pulses: Normal pulses.      Heart sounds: Normal heart sounds.   Pulmonary:      Effort: Pulmonary effort is normal.      Breath sounds: Normal breath sounds.   Abdominal:      General: Abdomen is flat. Bowel sounds are normal.   Musculoskeletal:         General: Normal range of motion.      Cervical back: Normal range of motion and neck supple.   Skin:     General: Skin is warm.      Capillary Refill: Capillary refill takes less than 2 seconds.   Neurological:      General: " No focal deficit present.      Mental Status: She is alert and oriented to person, place, and time.   Psychiatric:         Mood and Affect: Mood normal.         Behavior: Behavior normal.         Patient's Body mass index is 32.15 kg/m². indicating that she is obese (BMI >30). Obesity-related health conditions include the following: dyslipidemias. Obesity is unchanged. BMI is is above average; BMI management plan is completed. We discussed low calorie, low carb based diet program, portion control and increasing exercise..        ASSESSMENT/ PLAN:    Diagnoses and all orders for this visit:    1. Acquired hypothyroidism (Primary)    2. Hyperlipidemia, mixed  -     atorvastatin (LIPITOR) 20 MG tablet; Take 1 tablet by mouth Daily.  Dispense: 90 tablet; Refill: 3      Hypothyroidism controlled with medication    Management Plan:     An After Visit Summary was printed and given to the patient at discharge.    Follow-up: Return in about 1 month (around 5/21/2022) for with lipids prior.    I spent 30 minutes caring for Daisy on this date of service. This time includes time spent by me in the following activities: preparing for the visit, reviewing tests, obtaining and/or reviewing a separately obtained history, performing a medically appropriate examination and/or evaluation, ordering medications, tests, or procedures and documenting information in the medical record     STEFANIA Rodriguez 4/21/2022 08:22 CDT  This note was electronically signed.

## 2022-04-22 DIAGNOSIS — E03.9 ACQUIRED HYPOTHYROIDISM: ICD-10-CM

## 2022-04-22 RX ORDER — LEVOTHYROXINE SODIUM 88 UG/1
TABLET ORAL
Qty: 30 TABLET | Refills: 0 | Status: SHIPPED | OUTPATIENT
Start: 2022-04-22 | End: 2022-05-20 | Stop reason: SDUPTHER

## 2022-04-22 NOTE — TELEPHONE ENCOUNTER
Rx Refill Note  Requested Prescriptions     Pending Prescriptions Disp Refills   • Euthyrox 88 MCG tablet [Pharmacy Med Name: Euthyrox 88 MCG Oral Tablet] 30 tablet 0     Sig: Take 1 tablet by mouth once daily      Last office visit with prescribing clinician: 1/22/2021      Next office visit with prescribing clinician: 5/20/2022      Component   Ref Range & Units 3 d ago 3 mo ago 1 yr ago   T4, Total   4.5 - 12.0 ug/dL 8.3  6.6  6.6        Component   Ref Range & Units 3 d ago 3 mo ago 1 yr ago   TSH   0.450 - 4.500 uIU/mL 3.950  14.300 High   6.790 High             Bri Jiménez MA  04/22/22, 12:02 CDT

## 2022-05-20 DIAGNOSIS — E03.9 ACQUIRED HYPOTHYROIDISM: ICD-10-CM

## 2022-05-20 RX ORDER — LEVOTHYROXINE SODIUM 88 UG/1
88 TABLET ORAL DAILY
Qty: 90 TABLET | Refills: 3 | Status: SHIPPED | OUTPATIENT
Start: 2022-05-20 | End: 2023-02-10

## 2022-05-20 RX ORDER — LEVOTHYROXINE SODIUM 88 UG/1
TABLET ORAL
Qty: 30 TABLET | Refills: 0 | OUTPATIENT
Start: 2022-05-20

## 2022-05-20 NOTE — TELEPHONE ENCOUNTER
Rx Refill Note  Requested Prescriptions     Pending Prescriptions Disp Refills   • levothyroxine (Euthyrox) 88 MCG tablet 90 tablet 3     Sig: Take 1 tablet by mouth Daily.      Last office visit with prescribing clinician: 4/21/22  Next office visit with prescribing clinician: Visit date not found       {TIP  Please add Last Relevant Lab 4/19/22    Dorinda Celeste MA  05/20/22, 12:38 CDT

## 2022-05-20 NOTE — TELEPHONE ENCOUNTER
Rx Refill Note  Requested Prescriptions     Pending Prescriptions Disp Refills   • Euthyrox 88 MCG tablet [Pharmacy Med Name: Euthyrox 88 MCG Oral Tablet] 30 tablet 0     Sig: Take 1 tablet by mouth once daily      Last office visit with prescribing clinician: 1/22/2021      Next office visit with prescribing clinician: Visit date not found         Bri Jiménez MA  05/20/22, 15:30 CDT

## 2023-02-03 ENCOUNTER — CLINICAL SUPPORT (OUTPATIENT)
Dept: FAMILY MEDICINE CLINIC | Facility: CLINIC | Age: 33
End: 2023-02-03
Payer: COMMERCIAL

## 2023-02-03 DIAGNOSIS — E03.9 ACQUIRED HYPOTHYROIDISM: Primary | ICD-10-CM

## 2023-02-03 DIAGNOSIS — E78.2 HYPERLIPIDEMIA, MIXED: ICD-10-CM

## 2023-02-04 LAB
CHOLEST SERPL-MCNC: 160 MG/DL (ref 100–199)
HDLC SERPL-MCNC: 38 MG/DL
LDLC SERPL CALC-MCNC: 111 MG/DL (ref 0–99)
LDLC/HDLC SERPL: 2.9 RATIO (ref 0–3.2)
T4 SERPL-MCNC: 8.3 UG/DL (ref 4.5–12)
TRIGL SERPL-MCNC: 55 MG/DL (ref 0–149)
TSH SERPL DL<=0.005 MIU/L-ACNC: 13.2 UIU/ML (ref 0.45–4.5)
VLDLC SERPL CALC-MCNC: 11 MG/DL (ref 5–40)

## 2023-02-07 ENCOUNTER — TELEPHONE (OUTPATIENT)
Dept: FAMILY MEDICINE CLINIC | Facility: CLINIC | Age: 33
End: 2023-02-07

## 2023-02-07 NOTE — TELEPHONE ENCOUNTER
Caller: Daisy Sanders    Relationship: Self    Best call back number: [phone]     What test was performed: LABS    When was the test performed: 2/3/23    Where was the test performed: IN OFFICE

## 2023-02-10 DIAGNOSIS — E03.9 ACQUIRED HYPOTHYROIDISM: ICD-10-CM

## 2023-02-10 RX ORDER — LEVOTHYROXINE SODIUM 0.12 MG/1
125 TABLET ORAL DAILY
Qty: 30 TABLET | Refills: 2 | Status: SHIPPED | OUTPATIENT
Start: 2023-02-10

## 2023-02-15 ENCOUNTER — OFFICE VISIT (OUTPATIENT)
Dept: FAMILY MEDICINE CLINIC | Facility: CLINIC | Age: 33
End: 2023-02-15
Payer: COMMERCIAL

## 2023-02-15 VITALS
HEIGHT: 64 IN | HEART RATE: 80 BPM | OXYGEN SATURATION: 98 % | TEMPERATURE: 97.8 F | WEIGHT: 167.8 LBS | DIASTOLIC BLOOD PRESSURE: 71 MMHG | BODY MASS INDEX: 28.65 KG/M2 | SYSTOLIC BLOOD PRESSURE: 116 MMHG

## 2023-02-15 DIAGNOSIS — E03.9 ACQUIRED HYPOTHYROIDISM: Primary | ICD-10-CM

## 2023-02-15 DIAGNOSIS — E01.0 THYROMEGALY: ICD-10-CM

## 2023-02-15 PROCEDURE — 99213 OFFICE O/P EST LOW 20 MIN: CPT | Performed by: NURSE PRACTITIONER

## 2023-02-15 NOTE — PROGRESS NOTES
"Chief Complaint  Labs Only    Subjective        Daisy Lizeth Sanders presents to [facility]  History of Present Illness  Presents to discuss recent lab results.  She admits to being more tired than usual but has busy schedule with home and work.      Objective   Vital Signs:  /71 (BP Location: Right arm, Patient Position: Sitting, Cuff Size: Adult)   Pulse 80   Temp 97.8 °F (36.6 °C)   Ht 162.6 cm (64.02\")   Wt 76.1 kg (167 lb 12.8 oz)   SpO2 98%   BMI 28.78 kg/m²   Estimated body mass index is 28.78 kg/m² as calculated from the following:    Height as of this encounter: 162.6 cm (64.02\").    Weight as of this encounter: 76.1 kg (167 lb 12.8 oz).      Physical Exam  Vitals and nursing note reviewed.   Constitutional:       General: She is not in acute distress.     Appearance: Normal appearance. She is not ill-appearing.   HENT:      Head: Normocephalic and atraumatic.   Neck:      Comments: Thyromegaly appreciated.  Cardiovascular:      Rate and Rhythm: Normal rate and regular rhythm.      Heart sounds: Normal heart sounds.   Skin:     General: Skin is warm and dry.   Neurological:      Mental Status: She is alert and oriented to person, place, and time.        Result Review :    TSH    TSH 4/19/22 2/3/23   TSH 3.950 13.200 (A)   (A) Abnormal value                      Assessment and Plan   Diagnoses and all orders for this visit:    1. Acquired hypothyroidism (Primary)  -     US Thyroid; Future    2. Thyromegaly  -     US Thyroid; Future    Further recommendations will be based upon results.         Follow Up   Return if symptoms worsen or fail to improve.  Patient was given instructions and counseling regarding her condition or for health maintenance advice. Please see specific information pulled into the AVS if appropriate.     STEFANIA Cosme  This note is electronically signed.  "

## 2023-03-08 ENCOUNTER — TELEPHONE (OUTPATIENT)
Dept: FAMILY MEDICINE CLINIC | Facility: CLINIC | Age: 33
End: 2023-03-08
Payer: COMMERCIAL

## 2023-03-08 NOTE — TELEPHONE ENCOUNTER
Pt called stating she had missed a call from our office yesterday 3/7. Unable to tell who called first, but she is requesting a call back after 3:30 because she will be asleep until then.

## 2023-03-16 DIAGNOSIS — E03.9 ACQUIRED HYPOTHYROIDISM: ICD-10-CM

## 2023-03-16 DIAGNOSIS — E01.0 THYROMEGALY: ICD-10-CM

## 2023-03-17 DIAGNOSIS — R93.89 ABNORMAL THYROID ULTRASOUND: ICD-10-CM

## 2023-03-17 DIAGNOSIS — E03.9 ACQUIRED HYPOTHYROIDISM: ICD-10-CM

## 2023-03-17 DIAGNOSIS — E01.0 THYROMEGALY: Primary | ICD-10-CM

## 2023-03-22 ENCOUNTER — TELEPHONE (OUTPATIENT)
Dept: FAMILY MEDICINE CLINIC | Facility: CLINIC | Age: 33
End: 2023-03-22
Payer: COMMERCIAL

## 2023-03-22 NOTE — TELEPHONE ENCOUNTER
Pt called in wanting to know if jarred was made for her referral to a specialist about her thyroid

## 2023-05-04 ENCOUNTER — OFFICE VISIT (OUTPATIENT)
Dept: FAMILY MEDICINE CLINIC | Facility: CLINIC | Age: 33
End: 2023-05-04
Payer: COMMERCIAL

## 2023-05-04 VITALS
HEIGHT: 63 IN | WEIGHT: 166.8 LBS | DIASTOLIC BLOOD PRESSURE: 73 MMHG | TEMPERATURE: 97.6 F | SYSTOLIC BLOOD PRESSURE: 108 MMHG | OXYGEN SATURATION: 100 % | HEART RATE: 67 BPM | BODY MASS INDEX: 29.55 KG/M2

## 2023-05-04 DIAGNOSIS — J02.9 ACUTE PHARYNGITIS, UNSPECIFIED ETIOLOGY: Primary | ICD-10-CM

## 2023-05-04 DIAGNOSIS — E78.2 HYPERLIPIDEMIA, MIXED: ICD-10-CM

## 2023-05-04 DIAGNOSIS — Z00.00 ANNUAL PHYSICAL EXAM: Primary | ICD-10-CM

## 2023-05-04 DIAGNOSIS — E03.9 ACQUIRED HYPOTHYROIDISM: ICD-10-CM

## 2023-05-04 PROCEDURE — 99213 OFFICE O/P EST LOW 20 MIN: CPT | Performed by: NURSE PRACTITIONER

## 2023-05-04 PROCEDURE — 1159F MED LIST DOCD IN RCRD: CPT | Performed by: NURSE PRACTITIONER

## 2023-05-04 PROCEDURE — 1160F RVW MEDS BY RX/DR IN RCRD: CPT | Performed by: NURSE PRACTITIONER

## 2023-05-04 RX ORDER — AZITHROMYCIN 250 MG/1
TABLET, FILM COATED ORAL
Qty: 6 TABLET | Refills: 0 | Status: SHIPPED | OUTPATIENT
Start: 2023-05-04

## 2023-05-04 NOTE — PROGRESS NOTES
"Chief Complaint  Sore Throat (Both sides, hurts to touch on outside, hurts to swallow)    Subjective        Daisy Lizeth Sanders presents to [facility]  History of Present Illness  Patient woke with a sore throat on Tuesday and has had increasing throat pain since that time.  Her neck hurts as well.  She doesn't think that she has had a fever. No other symptoms related to URI.    Objective   Vital Signs:  /73 (BP Location: Left arm, Patient Position: Sitting, Cuff Size: Large Adult)   Pulse 67   Temp 97.6 °F (36.4 °C)   Ht 160 cm (63\")   Wt 75.7 kg (166 lb 12.8 oz)   SpO2 100%   BMI 29.55 kg/m²   Estimated body mass index is 29.55 kg/m² as calculated from the following:    Height as of this encounter: 160 cm (63\").    Weight as of this encounter: 75.7 kg (166 lb 12.8 oz).      Physical Exam  Vitals and nursing note reviewed.   Constitutional:       General: She is not in acute distress.     Appearance: Normal appearance. She is ill-appearing.   HENT:      Head: Normocephalic and atraumatic.      Right Ear: Tympanic membrane and ear canal normal.      Left Ear: Tympanic membrane and ear canal normal.      Nose: Nose normal.      Mouth/Throat:      Mouth: Mucous membranes are moist.      Pharynx: Posterior oropharyngeal erythema present.      Comments: Beefy erythema of oropharynx with absence of tonsils. Petechiae of soft palate appreciated.  Cardiovascular:      Rate and Rhythm: Normal rate and regular rhythm.      Heart sounds: Normal heart sounds. No murmur heard.  Pulmonary:      Effort: Pulmonary effort is normal.      Breath sounds: Normal breath sounds.   Lymphadenopathy:      Cervical: Cervical adenopathy present.   Skin:     General: Skin is warm and dry.   Neurological:      Mental Status: She is alert and oriented to person, place, and time.        Result Review :                Assessment and Plan   Diagnoses and all orders for this visit:    1. Acute " pharyngitis, unspecified etiology (Primary)  -     azithromycin (Zithromax Z-Francis) 250 MG tablet; Take 2 tablets the first day, then 1 tablet daily for 4 days.  Dispense: 6 tablet; Refill: 0             Follow Up   Return in about 1 week (around 5/11/2023) for Annual physical (labs today).  Patient was given instructions and counseling regarding her condition or for health maintenance advice. Please see specific information pulled into the AVS if appropriate.     STEFANIA Cosme  This note is electronically signed.

## 2023-05-05 LAB
ALBUMIN SERPL-MCNC: 4.1 G/DL (ref 3.8–4.8)
ALBUMIN/GLOB SERPL: 1.8 {RATIO} (ref 1.2–2.2)
ALP SERPL-CCNC: 64 IU/L (ref 44–121)
ALT SERPL-CCNC: 9 IU/L (ref 0–32)
AST SERPL-CCNC: 15 IU/L (ref 0–40)
BASOPHILS # BLD AUTO: 0.1 X10E3/UL (ref 0–0.2)
BASOPHILS NFR BLD AUTO: 1 %
BILIRUB SERPL-MCNC: 0.2 MG/DL (ref 0–1.2)
BUN SERPL-MCNC: 6 MG/DL (ref 6–20)
BUN/CREAT SERPL: 6 (ref 9–23)
CALCIUM SERPL-MCNC: 8.9 MG/DL (ref 8.7–10.2)
CHLORIDE SERPL-SCNC: 103 MMOL/L (ref 96–106)
CHOLEST SERPL-MCNC: 177 MG/DL (ref 100–199)
CO2 SERPL-SCNC: 20 MMOL/L (ref 20–29)
CREAT SERPL-MCNC: 0.95 MG/DL (ref 0.57–1)
EGFRCR SERPLBLD CKD-EPI 2021: 82 ML/MIN/1.73
EOSINOPHIL # BLD AUTO: 0.2 X10E3/UL (ref 0–0.4)
EOSINOPHIL NFR BLD AUTO: 2 %
ERYTHROCYTE [DISTWIDTH] IN BLOOD BY AUTOMATED COUNT: 13.7 % (ref 11.7–15.4)
GLOBULIN SER CALC-MCNC: 2.3 G/DL (ref 1.5–4.5)
GLUCOSE SERPL-MCNC: 82 MG/DL (ref 70–99)
HCT VFR BLD AUTO: 36.6 % (ref 34–46.6)
HDLC SERPL-MCNC: 40 MG/DL
HGB BLD-MCNC: 12.2 G/DL (ref 11.1–15.9)
IMM GRANULOCYTES # BLD AUTO: 0 X10E3/UL (ref 0–0.1)
IMM GRANULOCYTES NFR BLD AUTO: 0 %
LDLC SERPL CALC-MCNC: 121 MG/DL (ref 0–99)
LDLC/HDLC SERPL: 3 RATIO (ref 0–3.2)
LYMPHOCYTES # BLD AUTO: 3.5 X10E3/UL (ref 0.7–3.1)
LYMPHOCYTES NFR BLD AUTO: 36 %
MCH RBC QN AUTO: 27.7 PG (ref 26.6–33)
MCHC RBC AUTO-ENTMCNC: 33.3 G/DL (ref 31.5–35.7)
MCV RBC AUTO: 83 FL (ref 79–97)
MONOCYTES # BLD AUTO: 0.6 X10E3/UL (ref 0.1–0.9)
MONOCYTES NFR BLD AUTO: 6 %
NEUTROPHILS # BLD AUTO: 5.2 X10E3/UL (ref 1.4–7)
NEUTROPHILS NFR BLD AUTO: 55 %
PLATELET # BLD AUTO: 388 X10E3/UL (ref 150–450)
POTASSIUM SERPL-SCNC: 4 MMOL/L (ref 3.5–5.2)
PROT SERPL-MCNC: 6.4 G/DL (ref 6–8.5)
RBC # BLD AUTO: 4.41 X10E6/UL (ref 3.77–5.28)
SODIUM SERPL-SCNC: 139 MMOL/L (ref 134–144)
T4 SERPL-MCNC: 9.3 UG/DL (ref 4.5–12)
TRIGL SERPL-MCNC: 88 MG/DL (ref 0–149)
TSH SERPL DL<=0.005 MIU/L-ACNC: 5.43 UIU/ML (ref 0.45–4.5)
VLDLC SERPL CALC-MCNC: 16 MG/DL (ref 5–40)
WBC # BLD AUTO: 9.6 X10E3/UL (ref 3.4–10.8)

## 2023-05-09 DIAGNOSIS — E03.9 ACQUIRED HYPOTHYROIDISM: ICD-10-CM

## 2023-05-09 RX ORDER — LEVOTHYROXINE SODIUM 0.12 MG/1
TABLET ORAL
Qty: 30 TABLET | Refills: 0 | Status: SHIPPED | OUTPATIENT
Start: 2023-05-09 | End: 2023-05-12

## 2023-05-09 NOTE — TELEPHONE ENCOUNTER
Rx Refill Note  Requested Prescriptions     Pending Prescriptions Disp Refills   • levothyroxine (SYNTHROID, LEVOTHROID) 125 MCG tablet [Pharmacy Med Name: Levothyroxine Sodium 125 MCG Oral Tablet] 30 tablet 0     Sig: Take 1 tablet by mouth once daily      Last office visit with prescribing clinician: 5/4/2023   Last telemedicine visit with prescribing clinician: 5/4/2023   Next office visit with prescribing clinician: 5/12/2023               Elin Rodriguez MA  05/09/23, 16:03 CDT

## 2023-05-12 ENCOUNTER — OFFICE VISIT (OUTPATIENT)
Dept: FAMILY MEDICINE CLINIC | Facility: CLINIC | Age: 33
End: 2023-05-12
Payer: COMMERCIAL

## 2023-05-12 VITALS
HEART RATE: 91 BPM | HEIGHT: 63 IN | SYSTOLIC BLOOD PRESSURE: 102 MMHG | DIASTOLIC BLOOD PRESSURE: 69 MMHG | OXYGEN SATURATION: 99 % | WEIGHT: 165.6 LBS | BODY MASS INDEX: 29.34 KG/M2 | TEMPERATURE: 98.2 F

## 2023-05-12 DIAGNOSIS — E03.9 ACQUIRED HYPOTHYROIDISM: ICD-10-CM

## 2023-05-12 DIAGNOSIS — Z00.00 ANNUAL PHYSICAL EXAM: Primary | ICD-10-CM

## 2023-05-12 DIAGNOSIS — E66.3 OVERWEIGHT WITH BODY MASS INDEX (BMI) OF 29 TO 29.9 IN ADULT: ICD-10-CM

## 2023-05-12 RX ORDER — LEVOTHYROXINE SODIUM 137 UG/1
137 TABLET ORAL DAILY
Qty: 30 TABLET | Refills: 5 | Status: SHIPPED | OUTPATIENT
Start: 2023-05-12

## 2023-05-12 NOTE — PROGRESS NOTES
"Chief Complaint  Annual Exam    Subjective        Daisy Sanders presents to [facility]  History of Present Illness  No voiced concerns.    Objective   Vital Signs:  /69 (BP Location: Left arm, Patient Position: Sitting, Cuff Size: Adult)   Pulse 91   Temp 98.2 °F (36.8 °C)   Ht 160 cm (63\")   Wt 75.1 kg (165 lb 9.6 oz)   SpO2 99%   BMI 29.33 kg/m²   Estimated body mass index is 29.33 kg/m² as calculated from the following:    Height as of this encounter: 160 cm (63\").    Weight as of this encounter: 75.1 kg (165 lb 9.6 oz).    BMI is >= 25 and <30. (Overweight) The following options were offered after discussion;: exercise counseling/recommendations and nutrition counseling/recommendations        Physical Exam  Vitals and nursing note reviewed.   Constitutional:       General: She is not in acute distress.     Appearance: Normal appearance. She is not ill-appearing.   HENT:      Head: Normocephalic and atraumatic.   Cardiovascular:      Rate and Rhythm: Normal rate and regular rhythm.      Heart sounds: Normal heart sounds. No murmur heard.  Pulmonary:      Effort: Pulmonary effort is normal.      Breath sounds: Normal breath sounds.   Abdominal:      General: Bowel sounds are normal.      Palpations: Abdomen is soft.   Musculoskeletal:         General: Normal range of motion.      Cervical back: Neck supple.      Right lower leg: No edema.      Left lower leg: No edema.   Skin:     General: Skin is warm and dry.   Neurological:      Mental Status: She is alert and oriented to person, place, and time.        Result Review :                Assessment and Plan   Diagnoses and all orders for this visit:    1. Annual physical exam (Primary)    2. Acquired hypothyroidism  -     levothyroxine (SYNTHROID, LEVOTHROID) 137 MCG tablet; Take 1 tablet by mouth Daily.  Dispense: 30 tablet; Refill: 5    3. Overweight with body mass index (BMI) of 29 to 29.9 in adult    Patient " encouraged to partake of healthy diet rich in fresh fruits and vegetables as well as lean proteins.  Patient encouraged to participate in daily exercise with goal of 30 min sustained activity.         Follow Up   Return in about 6 months (around 11/12/2023) for lab only (TSH/T4).  Patient was given instructions and counseling regarding her condition or for health maintenance advice. Please see specific information pulled into the AVS if appropriate.     STEFANIA Cosme  This note is electronically signed.

## 2023-08-17 ENCOUNTER — APPOINTMENT (OUTPATIENT)
Dept: LAB | Facility: HOSPITAL | Age: 33
End: 2023-08-17
Payer: COMMERCIAL

## 2023-08-17 ENCOUNTER — OFFICE VISIT (OUTPATIENT)
Dept: ENDOCRINOLOGY | Facility: CLINIC | Age: 33
End: 2023-08-17
Payer: COMMERCIAL

## 2023-08-17 VITALS
DIASTOLIC BLOOD PRESSURE: 76 MMHG | BODY MASS INDEX: 28.31 KG/M2 | HEART RATE: 89 BPM | WEIGHT: 165.8 LBS | HEIGHT: 64 IN | OXYGEN SATURATION: 99 % | SYSTOLIC BLOOD PRESSURE: 120 MMHG

## 2023-08-17 DIAGNOSIS — E03.9 ACQUIRED HYPOTHYROIDISM: Primary | ICD-10-CM

## 2023-08-17 DIAGNOSIS — E04.9 ENLARGED THYROID GLAND: ICD-10-CM

## 2023-08-17 DIAGNOSIS — F17.200 SMOKING: ICD-10-CM

## 2023-08-17 PROBLEM — IMO0001 SMOKING: Status: ACTIVE | Noted: 2023-08-17

## 2023-08-17 LAB
ALBUMIN SERPL-MCNC: 4.1 G/DL (ref 3.5–5.2)
ALBUMIN/GLOB SERPL: 1.1 G/DL
ALP SERPL-CCNC: 70 U/L (ref 39–117)
ALT SERPL W P-5'-P-CCNC: 12 U/L (ref 1–33)
ANION GAP SERPL CALCULATED.3IONS-SCNC: 13 MMOL/L (ref 5–15)
AST SERPL-CCNC: 19 U/L (ref 1–32)
BASOPHILS # BLD AUTO: 0.08 10*3/MM3 (ref 0–0.2)
BASOPHILS NFR BLD AUTO: 0.8 % (ref 0–1.5)
BILIRUB SERPL-MCNC: 0.4 MG/DL (ref 0–1.2)
BUN SERPL-MCNC: 8 MG/DL (ref 6–20)
BUN/CREAT SERPL: 9.4 (ref 7–25)
CALCIUM SPEC-SCNC: 9.2 MG/DL (ref 8.6–10.5)
CHLORIDE SERPL-SCNC: 103 MMOL/L (ref 98–107)
CO2 SERPL-SCNC: 23 MMOL/L (ref 22–29)
CREAT SERPL-MCNC: 0.85 MG/DL (ref 0.57–1)
DEPRECATED RDW RBC AUTO: 41.5 FL (ref 37–54)
EGFRCR SERPLBLD CKD-EPI 2021: 94.1 ML/MIN/1.73
EOSINOPHIL # BLD AUTO: 0.25 10*3/MM3 (ref 0–0.4)
EOSINOPHIL NFR BLD AUTO: 2.6 % (ref 0.3–6.2)
ERYTHROCYTE [DISTWIDTH] IN BLOOD BY AUTOMATED COUNT: 14.3 % (ref 12.3–15.4)
GLOBULIN UR ELPH-MCNC: 3.7 GM/DL
GLUCOSE SERPL-MCNC: 83 MG/DL (ref 65–99)
HCT VFR BLD AUTO: 38.3 % (ref 34–46.6)
HGB BLD-MCNC: 12.6 G/DL (ref 12–15.9)
IMM GRANULOCYTES # BLD AUTO: 0.03 10*3/MM3 (ref 0–0.05)
IMM GRANULOCYTES NFR BLD AUTO: 0.3 % (ref 0–0.5)
LYMPHOCYTES # BLD AUTO: 3.08 10*3/MM3 (ref 0.7–3.1)
LYMPHOCYTES NFR BLD AUTO: 32.5 % (ref 19.6–45.3)
MCH RBC QN AUTO: 26.1 PG (ref 26.6–33)
MCHC RBC AUTO-ENTMCNC: 32.9 G/DL (ref 31.5–35.7)
MCV RBC AUTO: 79.3 FL (ref 79–97)
MONOCYTES # BLD AUTO: 0.68 10*3/MM3 (ref 0.1–0.9)
MONOCYTES NFR BLD AUTO: 7.2 % (ref 5–12)
NEUTROPHILS NFR BLD AUTO: 5.37 10*3/MM3 (ref 1.7–7)
NEUTROPHILS NFR BLD AUTO: 56.6 % (ref 42.7–76)
NRBC BLD AUTO-RTO: 0 /100 WBC (ref 0–0.2)
PLATELET # BLD AUTO: 366 10*3/MM3 (ref 140–450)
PMV BLD AUTO: 11.4 FL (ref 6–12)
POTASSIUM SERPL-SCNC: 4 MMOL/L (ref 3.5–5.2)
PROT SERPL-MCNC: 7.8 G/DL (ref 6–8.5)
RBC # BLD AUTO: 4.83 10*6/MM3 (ref 3.77–5.28)
SODIUM SERPL-SCNC: 139 MMOL/L (ref 136–145)
T4 FREE SERPL-MCNC: 1.62 NG/DL (ref 0.93–1.7)
TSH SERPL DL<=0.05 MIU/L-ACNC: 3.4 UIU/ML (ref 0.27–4.2)
WBC NRBC COR # BLD: 9.49 10*3/MM3 (ref 3.4–10.8)

## 2023-08-17 PROCEDURE — 80053 COMPREHEN METABOLIC PANEL: CPT | Performed by: NURSE PRACTITIONER

## 2023-08-17 PROCEDURE — 85025 COMPLETE CBC W/AUTO DIFF WBC: CPT | Performed by: NURSE PRACTITIONER

## 2023-08-17 PROCEDURE — 84439 ASSAY OF FREE THYROXINE: CPT | Performed by: NURSE PRACTITIONER

## 2023-08-17 PROCEDURE — 84443 ASSAY THYROID STIM HORMONE: CPT | Performed by: NURSE PRACTITIONER

## 2023-08-17 PROCEDURE — 36415 COLL VENOUS BLD VENIPUNCTURE: CPT | Performed by: NURSE PRACTITIONER

## 2023-08-17 PROCEDURE — 86376 MICROSOMAL ANTIBODY EACH: CPT | Performed by: NURSE PRACTITIONER

## 2023-08-17 NOTE — PROGRESS NOTES
"Chief Complaint  Thyroid Problem    Subjective          Daisy Sanders presents to [facility]  History of Present Illness    In office visit    Referring provider STEFANIA Jansen      Chief Complaint   Patient presents with    Thyroid Problem       HPI    33-year-old female presents for consultation    Reason hypothyroidism      Duration--diagnosed over 9 years ago     Context --presented for symptoms     Timing constant    Quality not controlled    Severity mild    Taking levothyroxine                     Review of Systems - General ROS: positive for  - fatigue        Objective   Vital Signs:   /76   Pulse 89   Ht 162.6 cm (64\")   Wt 75.2 kg (165 lb 12.8 oz)   SpO2 99%   BMI 28.46 kg/mý     Physical Exam  Constitutional:       Appearance: Normal appearance.   Neck:      Comments: Enlarged thyroid- 25 gm gland  Cardiovascular:      Rate and Rhythm: Regular rhythm.      Heart sounds: Normal heart sounds.   Musculoskeletal:         General: Normal range of motion.      Cervical back: Normal range of motion.   Neurological:      Mental Status: She is alert.      Result Review :     Common labs          2/3/2023    13:29 5/4/2023    07:54   Common Labs   Glucose  82    BUN  6    Creatinine  0.95    Sodium  139    Potassium  4.0    Chloride  103    Calcium  8.9    Total Protein  6.4    Albumin  4.1    Total Bilirubin  0.2    Alkaline Phosphatase  64    AST (SGOT)  15    ALT (SGPT)  9    WBC  9.6    Hemoglobin  12.2    Hematocrit  36.6    Platelets  388    Total Cholesterol 160  177    Triglycerides 55  88    HDL Cholesterol 38  40    LDL Cholesterol  111  121                Assessment and Plan    Diagnoses and all orders for this visit:    1. Acquired hypothyroidism (Primary)  -     CBC & Differential  -     Comprehensive Metabolic Panel  -     TSH  -     T4, Free  -     Thyroid Peroxidase Antibody    2. Enlarged thyroid gland    3. " Smoking                Thyroid Health    Hypothyroidism         Lab Results   Component Value Date    TSH 5.430 (H) 05/04/2023         Taking levothyroxine 137 mcg daily         Enlarged thyroid     No masses or nodules      1 cm isthmus     Ultrasound report consistent with chronic thyroiditis, she most likely has hashimoto's , it cannot be determined from ultrasound hashimoto's vs graves they look similar on images     She is not hyperthyroid  , she is hypothyroid which is consist ant with hashimoto's       Preventive care;    Smoking    Daisy Sanders  reports that she has been smoking cigarettes. She started smoking about 7 years ago. She has a 7.00 pack-year smoking history. She has never used smokeless tobacco.. I have educated her on the risk of diseases from using tobacco products such as cancer, COPD, and heart disease.     I advised her to quit and she is not willing to quit.    I spent 3  minutes counseling the patient.       Records from [facility] received and reviewed from 2023  Thank you for this consultation         Follow Up   Return in about 3 months (around 11/17/2023) for Recheck, labs today.  Patient was given instructions and counseling regarding her condition or for health maintenance advice. Please see specific information pulled into the AVS if appropriate.         This document has been electronically signed by STEFANIA Johnson on August 17, 2023 16:28 CDT.

## 2023-08-18 DIAGNOSIS — E03.9 ACQUIRED HYPOTHYROIDISM: Primary | ICD-10-CM

## 2023-08-19 LAB — THYROPEROXIDASE AB SERPL-ACNC: 401 IU/ML (ref 0–34)

## 2023-10-11 ENCOUNTER — OFFICE VISIT (OUTPATIENT)
Dept: FAMILY MEDICINE CLINIC | Facility: CLINIC | Age: 33
End: 2023-10-11
Payer: COMMERCIAL

## 2023-10-11 VITALS
WEIGHT: 167 LBS | TEMPERATURE: 97.7 F | OXYGEN SATURATION: 100 % | HEIGHT: 64 IN | BODY MASS INDEX: 28.51 KG/M2 | HEART RATE: 78 BPM | SYSTOLIC BLOOD PRESSURE: 106 MMHG | DIASTOLIC BLOOD PRESSURE: 70 MMHG

## 2023-10-11 DIAGNOSIS — Z72.0 TOBACCO ABUSE: ICD-10-CM

## 2023-10-11 DIAGNOSIS — E66.3 OVERWEIGHT WITH BODY MASS INDEX (BMI) OF 28 TO 28.9 IN ADULT: ICD-10-CM

## 2023-10-11 DIAGNOSIS — J01.00 ACUTE NON-RECURRENT MAXILLARY SINUSITIS: Primary | ICD-10-CM

## 2023-10-11 RX ORDER — TRIAMCINOLONE ACETONIDE 40 MG/ML
80 INJECTION, SUSPENSION INTRA-ARTICULAR; INTRAMUSCULAR ONCE
Status: COMPLETED | OUTPATIENT
Start: 2023-10-11 | End: 2023-10-11

## 2023-10-11 RX ORDER — AZITHROMYCIN 250 MG/1
TABLET, FILM COATED ORAL
Qty: 6 TABLET | Refills: 0 | Status: SHIPPED | OUTPATIENT
Start: 2023-10-11

## 2023-10-11 RX ADMIN — TRIAMCINOLONE ACETONIDE 80 MG: 40 INJECTION, SUSPENSION INTRA-ARTICULAR; INTRAMUSCULAR at 08:05

## 2023-10-11 NOTE — PROGRESS NOTES
"Chief Complaint   Patient presents with    Sinusitis    Cough        Subjective   Daisy Sanders is a 33 y.o. female who presents today for head congestion and cough.     HPI   She started with head congestion 1 month ago and approximately 2 weeks ago she developed a cough. She is a smoker.     Allergies   Allergen Reactions    Oxycodone Rash    Penicillins Unknown - Low Severity     Told as a child she was allergic to penicillins, does not know reaction.         OBJECTIVE:  Vitals:    10/11/23 0750   BP: 106/70   BP Location: Left arm   Patient Position: Sitting   Cuff Size: Large Adult   Pulse: 78   Temp: 97.7 øF (36.5 øC)   SpO2: 100%   Weight: 75.8 kg (167 lb)   Height: 162.6 cm (64\")     Physical Exam  Vitals and nursing note reviewed.   Constitutional:       Appearance: Normal appearance.   HENT:      Right Ear: Tympanic membrane is bulging.      Left Ear: Tympanic membrane is bulging.      Nose: Congestion and rhinorrhea present.   Cardiovascular:      Rate and Rhythm: Normal rate and regular rhythm.      Pulses: Normal pulses.      Heart sounds: Normal heart sounds.   Pulmonary:      Effort: Pulmonary effort is normal.      Breath sounds: Normal breath sounds.   Skin:     General: Skin is warm and dry.   Neurological:      General: No focal deficit present.      Mental Status: She is alert and oriented to person, place, and time.   Psychiatric:         Mood and Affect: Mood normal.         Behavior: Behavior normal.         Thought Content: Thought content normal.         Judgment: Judgment normal.         BMI is >= 25 and <30. (Overweight) The following options were offered after discussion;: weight loss educational material (shared in after visit summary), exercise counseling/recommendations, and nutrition counseling/recommendations           ASSESSMENT/ PLAN:    Diagnoses and all orders for this visit:    1. Acute non-recurrent maxillary sinusitis (Primary)  -     triamcinolone acetonide (KENALOG-40) " injection 80 mg  -     azithromycin (Zithromax Z-Francis) 250 MG tablet; Take 2 tablets by mouth on day 1, then 1 tablet daily on days 2-5  Dispense: 6 tablet; Refill: 0    2. Overweight with body mass index (BMI) of 28 to 28.9 in adult    3. Tobacco abuse      Procedures     Management Plan:   Finish out all antibiotics. Drink plenty of fluids.    She refuses any cough medicine.   An After Visit Summary was printed and given to the patient at discharge.    Follow-up: Return if symptoms worsen or fail to improve.         STEFANIA Rodriguez 10/11/2023 08:05 CDT  This note was electronically signed.

## 2023-10-31 DIAGNOSIS — E03.9 ACQUIRED HYPOTHYROIDISM: ICD-10-CM

## 2023-10-31 RX ORDER — LEVOTHYROXINE SODIUM 137 UG/1
137 TABLET ORAL DAILY
Qty: 30 TABLET | Refills: 5 | Status: SHIPPED | OUTPATIENT
Start: 2023-10-31

## 2023-10-31 NOTE — TELEPHONE ENCOUNTER
Rx Refill Note  Requested Prescriptions     Pending Prescriptions Disp Refills    levothyroxine (SYNTHROID, LEVOTHROID) 137 MCG tablet [Pharmacy Med Name: Levothyroxine Sodium 137 MCG Oral Tablet] 30 tablet 0     Sig: Take 1 tablet by mouth once daily      Last office visit with prescribing clinician: 5/12/2023   Last telemedicine visit with prescribing clinician: Visit date not found   Next office visit with prescribing clinician: Visit date not found       Protocol met    Kimberlee Hopkins MA  10/31/23, 12:58 CDT

## 2023-12-08 DIAGNOSIS — E03.9 ACQUIRED HYPOTHYROIDISM: ICD-10-CM

## 2024-03-27 ENCOUNTER — OFFICE VISIT (OUTPATIENT)
Dept: FAMILY MEDICINE CLINIC | Facility: CLINIC | Age: 34
End: 2024-03-27
Payer: COMMERCIAL

## 2024-03-27 VITALS
DIASTOLIC BLOOD PRESSURE: 75 MMHG | BODY MASS INDEX: 29.23 KG/M2 | HEIGHT: 63 IN | TEMPERATURE: 97.3 F | HEART RATE: 74 BPM | OXYGEN SATURATION: 96 % | WEIGHT: 165 LBS | SYSTOLIC BLOOD PRESSURE: 110 MMHG

## 2024-03-27 DIAGNOSIS — J45.31 MILD PERSISTENT ASTHMATIC BRONCHITIS WITH ACUTE EXACERBATION: Primary | ICD-10-CM

## 2024-03-27 PROCEDURE — 1159F MED LIST DOCD IN RCRD: CPT | Performed by: NURSE PRACTITIONER

## 2024-03-27 PROCEDURE — 99213 OFFICE O/P EST LOW 20 MIN: CPT | Performed by: NURSE PRACTITIONER

## 2024-03-27 PROCEDURE — 1160F RVW MEDS BY RX/DR IN RCRD: CPT | Performed by: NURSE PRACTITIONER

## 2024-03-27 RX ORDER — BENZONATATE 100 MG/1
100 CAPSULE ORAL 3 TIMES DAILY PRN
Qty: 30 CAPSULE | Refills: 1 | Status: SHIPPED | OUTPATIENT
Start: 2024-03-27

## 2024-03-27 RX ORDER — ALBUTEROL SULFATE 90 UG/1
2 AEROSOL, METERED RESPIRATORY (INHALATION) EVERY 6 HOURS
COMMUNITY
Start: 2024-02-23 | End: 2024-03-27 | Stop reason: SDDI

## 2024-03-27 RX ORDER — MONTELUKAST SODIUM 10 MG/1
10 TABLET ORAL NIGHTLY
Qty: 30 TABLET | Refills: 5 | Status: SHIPPED | OUTPATIENT
Start: 2024-03-27

## 2024-03-27 RX ORDER — MOMETASONE FUROATE AND FORMOTEROL FUMARATE DIHYDRATE 100; 5 UG/1; UG/1
2 AEROSOL RESPIRATORY (INHALATION)
Qty: 1 EACH | Refills: 5 | Status: SHIPPED | OUTPATIENT
Start: 2024-03-27

## 2024-03-27 RX ORDER — CETIRIZINE HYDROCHLORIDE 10 MG/1
10 TABLET ORAL DAILY
COMMUNITY
Start: 2024-02-23

## 2024-03-27 NOTE — PROGRESS NOTES
"Chief Complaint  Cough    Subjective        Daisy Sanders presents to [facility]  History of Present Illness  She has been coughing since first week of February. She went to [facility] twice, was given cetirizine and albuterol inhaler; the second time diagnosed with bronchitis and given antibiotics and steroids. Nothing has really helped.  She has had no imaging or blood work. The cough continues, with wheezing and primarily non-productive. She admits to post nasal drainage.    Objective   Vital Signs:  /75 (BP Location: Left arm, Patient Position: Sitting, Cuff Size: Adult)   Pulse 74   Temp 97.3 °F (36.3 °C)   Ht 160 cm (63\")   Wt 74.8 kg (165 lb)   SpO2 96%   BMI 29.23 kg/m²   Estimated body mass index is 29.23 kg/m² as calculated from the following:    Height as of this encounter: 160 cm (63\").    Weight as of this encounter: 74.8 kg (165 lb).             Physical Exam  Vitals and nursing note reviewed.   Constitutional:       General: She is not in acute distress.     Appearance: Normal appearance. She is not ill-appearing.   HENT:      Head: Normocephalic and atraumatic.      Right Ear: Tympanic membrane and ear canal normal.      Left Ear: Tympanic membrane and ear canal normal.      Nose: Nose normal.      Mouth/Throat:      Mouth: Mucous membranes are moist.      Pharynx: Oropharynx is clear.   Cardiovascular:      Rate and Rhythm: Normal rate and regular rhythm.      Heart sounds: Normal heart sounds.   Pulmonary:      Effort: Pulmonary effort is normal.      Breath sounds: Wheezing present.      Comments: Inspiratory and expiratory wheezes throughout lung fields.  Lymphadenopathy:      Cervical: No cervical adenopathy.   Skin:     General: Skin is warm and dry.   Neurological:      Mental Status: She is alert.        Result Review :                Assessment and Plan   Diagnoses and all orders for this visit:    1. Mild persistent asthmatic bronchitis " with acute exacerbation (Primary)  -     montelukast (Singulair) 10 MG tablet; Take 1 tablet by mouth Every Night.  Dispense: 30 tablet; Refill: 5  -     mometasone-formoterol (Dulera) 100-5 MCG/ACT inhaler; Inhale 2 puffs 2 (Two) Times a Day.  Dispense: 1 each; Refill: 5  -     benzonatate (Tessalon Perles) 100 MG capsule; Take 1 capsule by mouth 3 (Three) Times a Day As Needed for Cough.  Dispense: 30 capsule; Refill: 1             Follow Up   Return if symptoms worsen or fail to improve.  Patient was given instructions and counseling regarding her condition or for health maintenance advice. Please see specific information pulled into the AVS if appropriate.     STEFANIA Cosme  This note is electronically signed.

## 2024-07-24 ENCOUNTER — OFFICE VISIT (OUTPATIENT)
Dept: FAMILY MEDICINE CLINIC | Facility: CLINIC | Age: 34
End: 2024-07-24
Payer: COMMERCIAL

## 2024-07-24 VITALS
BODY MASS INDEX: 27.66 KG/M2 | SYSTOLIC BLOOD PRESSURE: 95 MMHG | WEIGHT: 162 LBS | HEART RATE: 67 BPM | DIASTOLIC BLOOD PRESSURE: 64 MMHG | HEIGHT: 64 IN | OXYGEN SATURATION: 97 % | TEMPERATURE: 98.2 F

## 2024-07-24 DIAGNOSIS — R21 ATYPICAL RASH: Primary | ICD-10-CM

## 2024-07-24 DIAGNOSIS — R21 BLISTERING RASH: ICD-10-CM

## 2024-07-24 PROCEDURE — 1160F RVW MEDS BY RX/DR IN RCRD: CPT | Performed by: NURSE PRACTITIONER

## 2024-07-24 PROCEDURE — 1159F MED LIST DOCD IN RCRD: CPT | Performed by: NURSE PRACTITIONER

## 2024-07-24 PROCEDURE — 99213 OFFICE O/P EST LOW 20 MIN: CPT | Performed by: NURSE PRACTITIONER

## 2024-07-24 PROCEDURE — 1126F AMNT PAIN NOTED NONE PRSNT: CPT | Performed by: NURSE PRACTITIONER

## 2024-07-24 NOTE — PROGRESS NOTES
"Chief Complaint  Blister (Start as blisters, then becomes sores on multiple areas)    Subjective        Daisy Sanders presents to [facility]  History of Present Illness    Patient presents for blisters on her elbows and knees that become present off and on for 3 years. Before the blisters arise, the spot becomes itchy. She has not noticed a trend of what is causing these spots. She states the blisters fill with fluid, break open, and then scab.     Objective   Vital Signs:  BP 95/64 (BP Location: Left arm, Patient Position: Sitting, Cuff Size: Large Adult)   Pulse 67   Temp 98.2 °F (36.8 °C)   Ht 162.6 cm (64\")   Wt 73.5 kg (162 lb)   SpO2 97%   BMI 27.81 kg/m²   Estimated body mass index is 27.81 kg/m² as calculated from the following:    Height as of this encounter: 162.6 cm (64\").    Weight as of this encounter: 73.5 kg (162 lb).             Physical Exam  Vitals and nursing note reviewed.   Constitutional:       General: She is not in acute distress.     Appearance: Normal appearance. She is not ill-appearing.   HENT:      Head: Normocephalic and atraumatic.   Cardiovascular:      Rate and Rhythm: Normal rate and regular rhythm.      Heart sounds: Normal heart sounds.   Skin:     General: Skin is warm and dry.      Comments: Evidence of solitary lesions on bilateral elbows with scabbing present. There are scars as well from previous lesions. No active blistering noted.   Neurological:      Mental Status: She is alert.        Result Review :                Assessment and Plan   Diagnoses and all orders for this visit:    1. Atypical rash (Primary)  -     Ambulatory Referral to Dermatology    2. Blistering rash  -     Ambulatory Referral to Dermatology             Follow Up   Return if symptoms worsen or fail to improve.  Patient was given instructions and counseling regarding her condition or for health maintenance advice. Please see specific information pulled into " the AVS if appropriate.     STEFANIA Cosme  This note is electronically signed.

## 2024-08-02 NOTE — TELEPHONE ENCOUNTER
Attempted to contact patient, left a voicemail to contac the office.   
Patient went to [facility] for abdominal pain. They stated it could be her ovaries or her appendix. Would you like for me to schedule her an appointment?  
Spoke with patient and scheduled her an appt with Marj on 7/23/21  
Was any imaging done?  Or recommended?  She will need an appointment so that we can assess her if no imaging has been done.  
never

## 2025-05-02 ENCOUNTER — OFFICE VISIT (OUTPATIENT)
Dept: FAMILY MEDICINE CLINIC | Facility: CLINIC | Age: 35
End: 2025-05-02
Payer: COMMERCIAL

## 2025-05-02 VITALS
HEART RATE: 81 BPM | DIASTOLIC BLOOD PRESSURE: 74 MMHG | WEIGHT: 153.6 LBS | BODY MASS INDEX: 27.21 KG/M2 | TEMPERATURE: 97.5 F | SYSTOLIC BLOOD PRESSURE: 112 MMHG | HEIGHT: 63 IN | OXYGEN SATURATION: 98 %

## 2025-05-02 DIAGNOSIS — E78.2 HYPERLIPIDEMIA, MIXED: ICD-10-CM

## 2025-05-02 DIAGNOSIS — E66.3 OVERWEIGHT WITH BODY MASS INDEX (BMI) OF 27 TO 27.9 IN ADULT: ICD-10-CM

## 2025-05-02 DIAGNOSIS — Z00.00 ANNUAL PHYSICAL EXAM: Primary | ICD-10-CM

## 2025-05-02 DIAGNOSIS — E03.9 ACQUIRED HYPOTHYROIDISM: ICD-10-CM

## 2025-05-02 DIAGNOSIS — Z11.59 ENCOUNTER FOR HEPATITIS C SCREENING TEST FOR LOW RISK PATIENT: ICD-10-CM

## 2025-05-02 NOTE — PROGRESS NOTES
"Chief Complaint  Annual Exam    Subjective        Daisy Sanders presents to [facility]  History of Present Illness  No current concerns. She is trying to quit smoking.    Objective   Vital Signs:  /74 (BP Location: Left arm, Patient Position: Sitting, Cuff Size: Adult)   Pulse 81   Temp 97.5 °F (36.4 °C) (Temporal)   Ht 160 cm (63\")   Wt 69.7 kg (153 lb 9.6 oz)   SpO2 98%   BMI 27.21 kg/m²   Estimated body mass index is 27.21 kg/m² as calculated from the following:    Height as of this encounter: 160 cm (63\").    Weight as of this encounter: 69.7 kg (153 lb 9.6 oz).    BMI is >= 25 and <30. (Overweight) The following options were offered after discussion;: exercise counseling/recommendations and nutrition counseling/recommendations        Physical Exam  Vitals and nursing note reviewed.   Constitutional:       General: She is not in acute distress.     Appearance: Normal appearance. She is not ill-appearing.   HENT:      Head: Normocephalic and atraumatic.   Neck:      Vascular: No carotid bruit.   Cardiovascular:      Rate and Rhythm: Normal rate and regular rhythm.      Pulses: Normal pulses.      Heart sounds: Normal heart sounds.   Pulmonary:      Effort: Pulmonary effort is normal.      Breath sounds: Normal breath sounds.   Abdominal:      General: Bowel sounds are normal.      Palpations: Abdomen is soft.   Musculoskeletal:         General: Normal range of motion.      Cervical back: Neck supple.      Right lower leg: No edema.      Left lower leg: No edema.   Skin:     General: Skin is warm and dry.      Capillary Refill: Capillary refill takes less than 2 seconds.   Neurological:      Mental Status: She is alert and oriented to person, place, and time.        Result Review :                Assessment and Plan   Diagnoses and all orders for this visit:    1. Annual physical exam (Primary)  -     CBC w AUTO Differential  -     Comprehensive metabolic " panel  -     Lipid Panel With LDL/HDL Ratio  -     T4  -     TSH  -     Hepatitis C antibody    2. Acquired hypothyroidism  -     T4  -     TSH    3. Hyperlipidemia, mixed  -     CBC w AUTO Differential  -     Comprehensive metabolic panel  -     Lipid Panel With LDL/HDL Ratio    4. Encounter for hepatitis C screening test for low risk patient  -     Hepatitis C antibody    5. Overweight with body mass index (BMI) of 27 to 27.9 in adult    Further recommendations will be based upon lab results.  Patient encouraged to partake of healthy diet rich in fresh fruits and vegetables as well as lean proteins.  Patient encouraged to participate in daily exercise with goal of 30 min sustained activity.         Follow Up   Return in about 1 year (around 5/2/2026) for annual physical with labs prior.  Patient was given instructions and counseling regarding her condition or for health maintenance advice. Please see specific information pulled into the AVS if appropriate.     STEFANIA Cosme  This note is electronically signed.

## 2025-05-03 LAB
ALBUMIN SERPL-MCNC: 4.3 G/DL (ref 3.9–4.9)
ALP SERPL-CCNC: 69 IU/L (ref 44–121)
ALT SERPL-CCNC: 13 IU/L (ref 0–32)
AST SERPL-CCNC: 17 IU/L (ref 0–40)
BASOPHILS # BLD AUTO: 0.1 X10E3/UL (ref 0–0.2)
BASOPHILS NFR BLD AUTO: 1 %
BILIRUB SERPL-MCNC: 0.2 MG/DL (ref 0–1.2)
BUN SERPL-MCNC: 5 MG/DL (ref 6–20)
BUN/CREAT SERPL: 6 (ref 9–23)
CALCIUM SERPL-MCNC: 9.5 MG/DL (ref 8.7–10.2)
CHLORIDE SERPL-SCNC: 102 MMOL/L (ref 96–106)
CHOLEST SERPL-MCNC: 188 MG/DL (ref 100–199)
CO2 SERPL-SCNC: 22 MMOL/L (ref 20–29)
CREAT SERPL-MCNC: 0.78 MG/DL (ref 0.57–1)
EGFRCR SERPLBLD CKD-EPI 2021: 103 ML/MIN/1.73
EOSINOPHIL # BLD AUTO: 0.3 X10E3/UL (ref 0–0.4)
EOSINOPHIL NFR BLD AUTO: 3 %
ERYTHROCYTE [DISTWIDTH] IN BLOOD BY AUTOMATED COUNT: 16 % (ref 11.7–15.4)
GLOBULIN SER CALC-MCNC: 2.5 G/DL (ref 1.5–4.5)
GLUCOSE SERPL-MCNC: 83 MG/DL (ref 70–99)
HCT VFR BLD AUTO: 39.1 % (ref 34–46.6)
HCV IGG SERPL QL IA: NON REACTIVE
HDLC SERPL-MCNC: 45 MG/DL
HGB BLD-MCNC: 12.2 G/DL (ref 11.1–15.9)
IMM GRANULOCYTES # BLD AUTO: 0 X10E3/UL (ref 0–0.1)
IMM GRANULOCYTES NFR BLD AUTO: 0 %
LDLC SERPL CALC-MCNC: 124 MG/DL (ref 0–99)
LDLC/HDLC SERPL: 2.8 RATIO (ref 0–3.2)
LYMPHOCYTES # BLD AUTO: 3.6 X10E3/UL (ref 0.7–3.1)
LYMPHOCYTES NFR BLD AUTO: 37 %
MCH RBC QN AUTO: 25.2 PG (ref 26.6–33)
MCHC RBC AUTO-ENTMCNC: 31.2 G/DL (ref 31.5–35.7)
MCV RBC AUTO: 81 FL (ref 79–97)
MONOCYTES # BLD AUTO: 0.5 X10E3/UL (ref 0.1–0.9)
MONOCYTES NFR BLD AUTO: 5 %
NEUTROPHILS # BLD AUTO: 5.4 X10E3/UL (ref 1.4–7)
NEUTROPHILS NFR BLD AUTO: 54 %
PLATELET # BLD AUTO: 466 X10E3/UL (ref 150–450)
POTASSIUM SERPL-SCNC: 4.3 MMOL/L (ref 3.5–5.2)
PROT SERPL-MCNC: 6.8 G/DL (ref 6–8.5)
RBC # BLD AUTO: 4.85 X10E6/UL (ref 3.77–5.28)
SODIUM SERPL-SCNC: 140 MMOL/L (ref 134–144)
T4 SERPL-MCNC: 11.3 UG/DL (ref 4.5–12)
TRIGL SERPL-MCNC: 103 MG/DL (ref 0–149)
TSH SERPL DL<=0.005 MIU/L-ACNC: 2.04 UIU/ML (ref 0.45–4.5)
VLDLC SERPL CALC-MCNC: 19 MG/DL (ref 5–40)
WBC # BLD AUTO: 9.9 X10E3/UL (ref 3.4–10.8)

## 2025-08-12 ENCOUNTER — OFFICE VISIT (OUTPATIENT)
Dept: FAMILY MEDICINE CLINIC | Facility: CLINIC | Age: 35
End: 2025-08-12
Payer: COMMERCIAL

## 2025-08-12 VITALS
SYSTOLIC BLOOD PRESSURE: 105 MMHG | OXYGEN SATURATION: 97 % | BODY MASS INDEX: 26.93 KG/M2 | HEART RATE: 87 BPM | RESPIRATION RATE: 18 BRPM | WEIGHT: 152 LBS | TEMPERATURE: 97.5 F | HEIGHT: 63 IN | DIASTOLIC BLOOD PRESSURE: 67 MMHG

## 2025-08-12 DIAGNOSIS — M54.2 NECK PAIN: ICD-10-CM

## 2025-08-12 DIAGNOSIS — M54.9 MID BACK PAIN: Primary | ICD-10-CM

## 2025-08-12 RX ORDER — KETOROLAC TROMETHAMINE 30 MG/ML
30 INJECTION, SOLUTION INTRAMUSCULAR; INTRAVENOUS ONCE
Status: COMPLETED | OUTPATIENT
Start: 2025-08-12 | End: 2025-08-12

## 2025-08-12 RX ORDER — MULTIPLE VITAMINS W/ MINERALS TAB 9MG-400MCG
1 TAB ORAL DAILY
COMMUNITY

## 2025-08-12 RX ORDER — CYCLOBENZAPRINE HCL 10 MG
10 TABLET ORAL 3 TIMES DAILY PRN
Qty: 30 TABLET | Refills: 2 | Status: SHIPPED | OUTPATIENT
Start: 2025-08-12

## 2025-08-12 RX ORDER — TRIAMCINOLONE ACETONIDE 40 MG/ML
80 INJECTION, SUSPENSION INTRA-ARTICULAR; INTRAMUSCULAR ONCE
Status: COMPLETED | OUTPATIENT
Start: 2025-08-12 | End: 2025-08-12

## 2025-08-12 RX ADMIN — KETOROLAC TROMETHAMINE 30 MG: 30 INJECTION, SOLUTION INTRAMUSCULAR; INTRAVENOUS at 07:46

## 2025-08-12 RX ADMIN — KETOROLAC TROMETHAMINE 30 MG: 30 INJECTION, SOLUTION INTRAMUSCULAR; INTRAVENOUS at 07:48

## 2025-08-12 RX ADMIN — TRIAMCINOLONE ACETONIDE 80 MG: 40 INJECTION, SUSPENSION INTRA-ARTICULAR; INTRAMUSCULAR at 07:47

## (undated) DEVICE — ADHS LIQ MASTISOL 2/3ML

## (undated) DEVICE — SLV SCD KN ADJ EXPRSS LG

## (undated) DEVICE — PAD C-SECTION: Brand: MEDLINE INDUSTRIES, INC.

## (undated) DEVICE — SUT VIC 0 CT1 36IN J946H

## (undated) DEVICE — GLV SURG TRIUMPH ORTHO W/ALOE PF LTX 7.0

## (undated) DEVICE — DRSNG BRDR MEPILEX P/OP SIL 4X12IN

## (undated) DEVICE — APPL CHLORAPREP W/TINT 26ML ORNG

## (undated) DEVICE — Device

## (undated) DEVICE — 3M™ STERI-STRIP™ REINFORCED ADHESIVE SKIN CLOSURES, R1547, 1/2 IN X 4 IN (12 MM X 100 MM), 6 STRIPS/ENVELOPE: Brand: 3M™ STERI-STRIP™